# Patient Record
Sex: MALE | Race: WHITE | NOT HISPANIC OR LATINO | Employment: FULL TIME | URBAN - METROPOLITAN AREA
[De-identification: names, ages, dates, MRNs, and addresses within clinical notes are randomized per-mention and may not be internally consistent; named-entity substitution may affect disease eponyms.]

---

## 2017-07-14 ENCOUNTER — TRANSFERRED RECORDS (OUTPATIENT)
Dept: HEALTH INFORMATION MANAGEMENT | Facility: CLINIC | Age: 27
End: 2017-07-14

## 2019-04-22 ENCOUNTER — TRANSFERRED RECORDS (OUTPATIENT)
Dept: HEALTH INFORMATION MANAGEMENT | Facility: CLINIC | Age: 29
End: 2019-04-22

## 2019-04-22 LAB — HIV 1&2 EXT: NORMAL

## 2019-05-02 ENCOUNTER — TRANSFERRED RECORDS (OUTPATIENT)
Dept: HEALTH INFORMATION MANAGEMENT | Facility: CLINIC | Age: 29
End: 2019-05-02

## 2019-05-02 LAB
CHOLESTEROL (EXTERNAL): 147 MG/DL (ref 125–200)
HDLC SERPL-MCNC: 41 MG/DL (ref 25–120)
LDL CHOLESTEROL DIRECT (EXTERNAL): 83 MG/DL (ref 65–130)
TRIGLYCERIDES (EXTERNAL): 143 MG/DL (ref 75–150)

## 2019-05-31 ENCOUNTER — TRANSFERRED RECORDS (OUTPATIENT)
Dept: HEALTH INFORMATION MANAGEMENT | Facility: CLINIC | Age: 29
End: 2019-05-31

## 2020-07-29 ENCOUNTER — VIRTUAL VISIT (OUTPATIENT)
Dept: FAMILY MEDICINE | Facility: OTHER | Age: 30
End: 2020-07-29
Payer: COMMERCIAL

## 2020-07-29 DIAGNOSIS — Z20.822 SUSPECTED 2019 NOVEL CORONAVIRUS INFECTION: Primary | ICD-10-CM

## 2020-07-29 PROCEDURE — 99421 OL DIG E/M SVC 5-10 MIN: CPT | Performed by: PHYSICIAN ASSISTANT

## 2020-07-29 NOTE — PROGRESS NOTES
"Date: 2020 14:19:18  Clinician: Reid Adams  Clinician NPI: [id number]  Patient: Jesus Savage  Patient : 1990  Patient Address: [address] [facility]  [place] [id number]  Patient Phone: [phone]  Visit Protocol: URI  Patient Summary:  Jesus is a 30 year old ( : 1990 ) male who initiated a Visit for COVID-19 (Coronavirus) evaluation and screening. When asked the question \"Please sign me up to receive news, health information and promotions from [employer].\", Jesus responded \"No\".    Jesus states his symptoms started today.   His symptoms consist of myalgia, a sore throat, nasal congestion, rhinitis, malaise, and a headache.   Symptom details     Nasal secretions: The color of his mucus is clear.    Sore throat: Jesus reports having mild throat pain (1-3 on a 10 point pain scale), does not have exudate on his tonsils, and can swallow liquids. He is not sure if the lymph nodes in his neck are enlarged. A rash has not appeared on the skin since the sore throat started.     Headache: He states the headache is mild (1-3 on a 10 point pain scale).      Jesus denies having wheezing, nausea, teeth pain, ageusia, diarrhea, anosmia, facial pain or pressure, fever, cough, vomiting, ear pain, and chills. He also denies having recent facial or sinus surgery in the past 60 days and taking antibiotic medication in the past month. He is not experiencing dyspnea.   Precipitating events  Within the past week, Jesus has not been exposed to someone with strep throat. He has not recently been exposed to someone with influenza. Jesus has been in close contact with the following high risk individuals: people with asthma, heart disease or diabetes and children under the age of 5.   Pertinent COVID-19 (Coronavirus) information  In the past 14 days, Jesus has not worked in a congregate living setting.   He does not work or volunteer as healthcare worker or a  and does not work or volunteer in " a healthcare facility.   Jesus also has not lived in a congregate living setting in the past 14 days. He does not live with a healthcare worker.   Jesus has had a close contact with a laboratory-confirmed COVID-19 patient within 14 days of symptom onset. Additional information about contact with COVID-19 (Coronavirus) patient as reported by the patient (free text): Exposed on July 24 at a golf outing   Pertinent medical history  Jesus does not need a return to work/school note.   Weight: 205 lbs   Jesus does not smoke or use smokeless tobacco.   Weight: 205 lbs    MEDICATIONS: No current medications, ALLERGIES: NKDA  Clinician Response:  Dear Jesus,   Your symptoms show that you may have coronavirus (COVID-19). This illness can cause fever, cough and trouble breathing. Many people get a mild case and get better on their own. Some people can get very sick.  What should I do?  We would like to test you for this virus.   1. Please call [phone] to schedule your visit. Explain that you were referred by [facility] to have a COVID-19 test. Be ready to share your [facility] visit ID number.  The following will serve as your written order for this COVID Test, ordered by me, for the indication of suspected COVID [Z20.828]: The test will be ordered in [employer], our electronic health record, after you are scheduled. It will show as ordered and authorized by Max Camacho MD.  Order: COVID-19 (Coronavirus) PCR for SYMPTOMATIC testing from [facility].      2. When it's time for your COVID test:  Stay at least 6 feet away from others. (If someone will drive you to your test, stay in the backseat, as far away from the  as you can.)   Cover your mouth and nose with a mask, tissue or washcloth.  Go straight to the testing site. Don't make any stops on the way there or back.      3.Starting now: Stay home and away from others (self-isolate) until:   You've had no fever---and no medicine that reduces fever---for 3 full days (72 hours).  "And...   Your other symptoms have gotten better. For example, your cough or breathing has improved. And...   At least 10 days have passed since your symptoms started.       During this time, don't leave the house except for testing or medical care.   Stay in your own room, even for meals. Use your own bathroom if you can.   Stay away from others in your home. No hugging, kissing or shaking hands. No visitors.  Don't go to work, school or anywhere else.    Clean \"high touch\" surfaces often (doorknobs, counters, handles, etc.). Use a household cleaning spray or wipes. You'll find a full list of  on the EPA website: www.epa.gov/pesticide-registration/list-n-disinfectants-use-against-sars-cov-2.   Cover your mouth and nose with a mask, tissue or washcloth to avoid spreading germs.  Wash your hands and face often. Use soap and water.  Caregivers in these groups are at risk for severe illness due to COVID-19:  o People 65 years and older  o People who live in a nursing home or long-term care facility  o People with chronic disease (lung, heart, cancer, diabetes, kidney, liver, immunologic)  o People who have a weakened immune system, including those who:   Are in cancer treatment  Take medicine that weakens the immune system, such as corticosteroids  Had a bone marrow or organ transplant  Have an immune deficiency  Have poorly controlled HIV or AIDS  Are obese (body mass index of 40 or higher)  Smoke regularly   o Caregivers should wear gloves while washing dishes, handling laundry and cleaning bedrooms and bathrooms.  o Use caution when washing and drying laundry: Don't shake dirty laundry, and use the warmest water setting that you can.  o For more tips, go to www.cdc.gov/coronavirus/2019-ncov/downloads/10Things.pdf.    4.Sign up for Get[employer] Loop. We know it's scary to hear that you might have COVID-19. We want to track your symptoms to make sure you're okay over the next 2 weeks. Please look for an email from " Cece Estevez---this is a free, online program that we'll use to keep in touch. To sign up, follow the link in the email. Learn more at [email or web address]  How can I take care of myself?   Get lots of rest. Drink extra fluids (unless a doctor has told you not to).   Take Tylenol (acetaminophen) for fever or pain. If you have liver or kidney problems, ask your family doctor if it's okay to take Tylenol.   Adults can take either:    650 mg (two 325 mg pills) every 4 to 6 hours, or...   1,000 mg (two 500 mg pills) every 8 hours as needed.    Note: Don't take more than 3,000 mg in one day. Acetaminophen is found in many medicines (both prescribed and over-the-counter medicines). Read all labels to be sure you don't take too much.   For children, check the Tylenol bottle for the right dose. The dose is based on the child's age or weight.    If you have other health problems (like cancer, heart failure, an organ transplant or severe kidney disease): Call your specialty clinic if you don't feel better in the next 2 days.       Know when to call 911. Emergency warning signs include:    Trouble breathing or shortness of breath Pain or pressure in the chest that doesn't go away Feeling confused like you haven't felt before, or not being able to wake up Bluish-colored lips or face.  Where can I get more information?   [facility] -- About COVID-19: [email or web address]   CDC -- What to Do If You're Sick: www.cdc.gov/coronavirus/2019-ncov/about/steps-when-sick.html   CDC -- Ending Home Isolation: www.cdc.gov/coronavirus/2019-ncov/hcp/disposition-in-home-patients.html   CDC -- Caring for Someone: www.cdc.gov/coronavirus/2019-ncov/if-you-are-sick/care-for-someone.html   [facility] -- Interim Guidance for Hospital Discharge to Home: www.health.[facility].[place].us/diseases/coronavirus/hcp/hospdischarge.pdf   [facility] clinical trials (COVID-19 research studies):  clinicalaffairs.[facility]-clinical-trials    Below are the COVID-19 hotlines at the [place] Department of Health ([facility]). Interpreters are available.    For health questions: Call 217-763-7092 or 1-523.766.4794 (7 a.m. to 7 p.m.) For questions about schools and childcare: Call 796-506-3282 or 1-228.104.5079 (7 a.m. to 7 p.m.)    Diagnosis: Acute upper respiratory infection, unspecified  Diagnosis ICD: J06.9  Additional Clinician Notes: if your symptoms are not improving or worsen, Please go to one of our urgent care locations for evaluation.

## 2020-07-30 DIAGNOSIS — Z20.822 SUSPECTED 2019 NOVEL CORONAVIRUS INFECTION: ICD-10-CM

## 2020-07-30 PROCEDURE — U0003 INFECTIOUS AGENT DETECTION BY NUCLEIC ACID (DNA OR RNA); SEVERE ACUTE RESPIRATORY SYNDROME CORONAVIRUS 2 (SARS-COV-2) (CORONAVIRUS DISEASE [COVID-19]), AMPLIFIED PROBE TECHNIQUE, MAKING USE OF HIGH THROUGHPUT TECHNOLOGIES AS DESCRIBED BY CMS-2020-01-R: HCPCS | Performed by: FAMILY MEDICINE

## 2020-08-01 LAB
SARS-COV-2 RNA SPEC QL NAA+PROBE: NOT DETECTED
SPECIMEN SOURCE: NORMAL

## 2021-01-09 ENCOUNTER — HEALTH MAINTENANCE LETTER (OUTPATIENT)
Age: 31
End: 2021-01-09

## 2021-10-23 ENCOUNTER — HEALTH MAINTENANCE LETTER (OUTPATIENT)
Age: 31
End: 2021-10-23

## 2022-02-12 ENCOUNTER — HEALTH MAINTENANCE LETTER (OUTPATIENT)
Age: 32
End: 2022-02-12

## 2022-06-21 ENCOUNTER — OFFICE VISIT (OUTPATIENT)
Dept: FAMILY MEDICINE | Facility: CLINIC | Age: 32
End: 2022-06-21
Payer: COMMERCIAL

## 2022-06-21 VITALS
RESPIRATION RATE: 16 BRPM | HEART RATE: 83 BPM | SYSTOLIC BLOOD PRESSURE: 120 MMHG | WEIGHT: 214 LBS | DIASTOLIC BLOOD PRESSURE: 82 MMHG | TEMPERATURE: 97.7 F | OXYGEN SATURATION: 98 %

## 2022-06-21 DIAGNOSIS — F33.0 MILD RECURRENT MAJOR DEPRESSION (H): Primary | ICD-10-CM

## 2022-06-21 PROCEDURE — 96127 BRIEF EMOTIONAL/BEHAV ASSMT: CPT | Performed by: FAMILY MEDICINE

## 2022-06-21 PROCEDURE — 99203 OFFICE O/P NEW LOW 30 MIN: CPT | Performed by: FAMILY MEDICINE

## 2022-06-21 RX ORDER — FLUOXETINE 10 MG/1
10 CAPSULE ORAL DAILY
Qty: 60 CAPSULE | Refills: 1 | Status: SHIPPED | OUTPATIENT
Start: 2022-06-21 | End: 2022-08-04

## 2022-06-21 ASSESSMENT — ANXIETY QUESTIONNAIRES
7. FEELING AFRAID AS IF SOMETHING AWFUL MIGHT HAPPEN: NOT AT ALL
7. FEELING AFRAID AS IF SOMETHING AWFUL MIGHT HAPPEN: NOT AT ALL
GAD7 TOTAL SCORE: 11
GAD7 TOTAL SCORE: 11
2. NOT BEING ABLE TO STOP OR CONTROL WORRYING: SEVERAL DAYS
5. BEING SO RESTLESS THAT IT IS HARD TO SIT STILL: MORE THAN HALF THE DAYS
8. IF YOU CHECKED OFF ANY PROBLEMS, HOW DIFFICULT HAVE THESE MADE IT FOR YOU TO DO YOUR WORK, TAKE CARE OF THINGS AT HOME, OR GET ALONG WITH OTHER PEOPLE?: VERY DIFFICULT
6. BECOMING EASILY ANNOYED OR IRRITABLE: NEARLY EVERY DAY
3. WORRYING TOO MUCH ABOUT DIFFERENT THINGS: SEVERAL DAYS
1. FEELING NERVOUS, ANXIOUS, OR ON EDGE: MORE THAN HALF THE DAYS
GAD7 TOTAL SCORE: 11
4. TROUBLE RELAXING: MORE THAN HALF THE DAYS

## 2022-06-21 ASSESSMENT — PATIENT HEALTH QUESTIONNAIRE - PHQ9
SUM OF ALL RESPONSES TO PHQ QUESTIONS 1-9: 21
10. IF YOU CHECKED OFF ANY PROBLEMS, HOW DIFFICULT HAVE THESE PROBLEMS MADE IT FOR YOU TO DO YOUR WORK, TAKE CARE OF THINGS AT HOME, OR GET ALONG WITH OTHER PEOPLE: VERY DIFFICULT
SUM OF ALL RESPONSES TO PHQ QUESTIONS 1-9: 21
SUM OF ALL RESPONSES TO PHQ QUESTIONS 1-9: 21

## 2022-06-21 ASSESSMENT — PAIN SCALES - GENERAL: PAINLEVEL: NO PAIN (0)

## 2022-06-21 NOTE — PROGRESS NOTES
Assessment & Plan     Mild recurrent major depression (H)  Start fluoxetine 10 mg at at bedtime for 1 to 2 weeks then increase to 20 mg I will see him back in 6 to 8 weeks he does have my personal cell number is with family friends he will contact me if he has question concerns.  He will continue with his counseling.  He I did reassure him he will improve over time but it may take some time  - FLUoxetine (PROZAC) 10 MG capsule; Take 1 capsule (10 mg) by mouth daily  :[id number]}     Depression Screening Follow Up    PHQ 6/21/2022   PHQ-9 Total Score 21   Q9: Thoughts of better off dead/self-harm past 2 weeks Several days   F/U: Thoughts of suicide or self-harm No   F/U: Safety concerns No   No follow-ups on file.    Cain Rodriguez MD, MD  [facility] Home[facility]ENA Lee is a 32 year old, presenting for the following health issues:      History of Present Illness       Mental Health Follow-up:  Patient presents to follow-up on Depression & Anxiety.Patient's depression since last visit has been:  No change  The patient is not having other symptoms associated with depression.  Patient's anxiety since last visit has been:  No change  The patient is not having other symptoms associated with anxiety.  Any significant life events: No  Patient is not feeling anxious or having panic attacks.  Patient has no concerns about alcohol or drug use.    He eats 2-3 servings of fruits and vegetables daily.He consumes 0 sweetened beverage(s) daily.He exercises with enough effort to increase his heart rate 30 to 60 minutes per day.  He exercises with enough effort to increase his heart rate 4 days per week.   He is taking medications regularly.    Today's PHQ-9         PHQ-9 Total Score: 21    PHQ-9 Q9 Thoughts of better off dead/self-harm past 2 weeks :   Several days  Thoughts of suicide or self harm: (P) No  Self-harm Plan:     Self-harm Action:       Safety concerns for self or others: (P) No    How  difficult have these problems made it for you to do your work, take care of things at home, or get along with other people: Very difficult  Today's YASMIN-7 Score: 11       Patient is wife convinced him to come in and not deal with his depression he has been struggling with depression for the past couple of years.  He just made an appointment for counseling couple of weeks ago is gone to get into some intensive counseling but he is at the point where I think he needs medications.  I have known this family for a long time I do not dellen since he was young child.  We did have a extended conversation today about 20 to 30 minutes about depression and the pathophysiology of depression and how we treated both with counseling and medications.  I did talk to him about the medications we used side effects of the medications and the benefits.  Did answer his questions to satisfaction.  We also talked about life situation and other circumstances.  He is not suicidal at this time but never hurt himself.    Review of Systems   Constitutional, HEENT, cardiovascular, pulmonary, gi and gu systems are negative, except as otherwise noted.      Objective    There were no vitals taken for this visit.  There is no height or weight on file to calculate BMI.  Physical Exam   GENERAL: healthy, alert and no distress                    .  ..

## 2022-06-24 ASSESSMENT — PATIENT HEALTH QUESTIONNAIRE - PHQ9
10. IF YOU CHECKED OFF ANY PROBLEMS, HOW DIFFICULT HAVE THESE PROBLEMS MADE IT FOR YOU TO DO YOUR WORK, TAKE CARE OF THINGS AT HOME, OR GET ALONG WITH OTHER PEOPLE: VERY DIFFICULT
SUM OF ALL RESPONSES TO PHQ QUESTIONS 1-9: 21

## 2022-06-24 NOTE — COMMUNITY RESOURCES LIST (ENGLISH)
06/24/2022   [facility] - Outpatient Clinics  Stephani Briceno  For questions about this resource list or additional care needs, please contact your primary care clinic or care manager.  Phone: [phone]   Email: N/A   Address: [address] [place] [address]   Hours: N/A        Hotlines and Helplines       Hotline - Crisis help  1  [facility] - 24-Hour Mental Health Crisis Hotline Distance: 8.92 miles      COVID-19 Status: Phone/Virtual   [address] [place] [address]  Language: English  Hours: Mon - Sun Open 24 Hours   Phone: (849) 274-8560 Email: care@[facility] Website: http://[facility].org     2  [facility] Distance: 9.42 miles      COVID-19 Status: Phone/Virtual   [address] [place] [address]  Language: English  Hours: Mon - Sun Open 24 Hours   Phone: (508) 484-2654 Email: info.TradeBlockgriffin@[employer] Website: http://www.[facility].org/          Mental Health       Individual counseling  3  [facility] [place] Distance: 2.85 miles      COVID-19 Status: Regular Operations, COVID-19 Status: Phone/Virtual   [address] [place] [address]  Language: English, Armenian  Hours: Mon - Fri 7:00 AM - 5:00 PM  Fees: Insurance, Self Pay, Sliding Fee   Phone: (235) 926-8752 Email: info@[employer] Website: https://www.InsideSales.com/     4  [facility] [place] Distance: 8.24 miles      COVID-19 Status: Regular Operations, COVID-19 Status: Phone/Virtual   [address] [place] [address]  Language: English  Hours: Mon 8:00 AM - 8:00 PM , Tue - Wed 8:00 AM - 7:00 PM , Thu 8:00 AM - 8:00 PM , Fri 8:00 AM - 4:00 PM  Fees: Insurance, Self Pay, Sliding Fee   Phone: (631) 426-8809 Email: [facility]@[employer] Website: https://trilogycounsOn license of [facility].[place]/     Mental health support group  5  [facility] Distance: 17.3 miles      COVID-19 Status:  Unable to Verify   [address] [place] [address]  Language: English  Hours: Tue 6:30 PM - 8:30 PM  Fees: Free   Phone: (349) 398-3681 Email: rachel.nonprofit@[employer] Website: http://www.IActionable/pages/home          Important Numbers & Websites       Emergency Services   [address]  [place] [address]  Poison Control   (394) 122-2446  Suicide Prevention Lifeline   (850) 881-8832 (TALK)  Child Abuse Hotline   (287) 533-4229 (4-A-Child)  Sexual Assault Hotline   (436) 737-7858 (HOPE)  National Runaway Safeline   (638) 850-5453 (RUNAWAY)  All-Options Talkline   (846) 135-5798  Substance Abuse Referral   (370) 270-1949 (HELP)     ER physician

## 2022-06-24 NOTE — COMMUNITY RESOURCES LIST (ENGLISH)
06/24/2022   [facility] - Outpatient Clinics  Stephani Briceno  For questions about this resource list or additional care needs, please contact your primary care clinic or care manager.  Phone: [phone]   Email: N/A   Address: [address] [place] [address]   Hours: N/A        Hotlines and Helplines       Hotline - Crisis help  1  [facility] - 24-Hour Mental Health Crisis Hotline Distance: 8.92 miles      COVID-19 Status: Phone/Virtual   [address] [place] [address]  Language: English  Hours: Mon - Sun Open 24 Hours   Phone: (130) 389-6212 Email: care@[facility] Website: http://[facility].org     2  [facility] Distance: 9.42 miles      COVID-19 Status: Phone/Virtual   [address] [place] [address]  Language: English  Hours: Mon - Sun Open 24 Hours   Phone: (998) 931-2739 Email: info.Language Logisticsgriffin@[employer].CH Website: http://www.Language LogisticsSaint [facility].org/          Mental Health       Individual counseling  3  [facility] [place] Distance: 2.85 miles      COVID-19 Status: Regular Operations, COVID-19 Status: Phone/Virtual   [address] [place] [address]  Language: English, Scottish  Hours: Mon - Fri 7:00 AM - 5:00 PM  Fees: Insurance, Self Pay, Sliding Fee   Phone: (183) 720-8950 Email: info@[employer] Website: https://www.[employer]/     4  [facility] [place] Distance: 8.24 miles      COVID-19 Status: Regular Operations, COVID-19 Status: Phone/Virtual   [address] [place] [address]  Language: English  Hours: Mon 8:00 AM - 8:00 PM , Tue - Wed 8:00 AM - 7:00 PM , Thu 8:00 AM - 8:00 PM , Fri 8:00 AM - 4:00 PM  Fees: Insurance, Self Pay, Sliding Fee   Phone: (724) 133-4098 Email: trilogycounsel@[employer].CH Website: https://trilogycounsAdventHealth.[employer]oltServer/     Mental health support group  5  [facility] Distance: 17.3 miles      COVID-19 Status:  Unable to Verify   [address] [place] [address]  Language: English  Hours: Tue 6:30 PM - 8:30 PM  Fees: Free   Phone: (797) 695-6865 Email: rachel.nonprofit@[employer] Website: http://www.bazinga! Technologies/pages/home          Important Numbers & Websites       Emergency Services   [address]  [place] [address]  Poison Control   (411) 279-8077  Suicide Prevention Lifeline   (188) 985-2673 (TALK)  Child Abuse Hotline   (407) 698-2391 (4-A-Child)  Sexual Assault Hotline   (713) 405-8022 (HOPE)  National Runaway Safeline   (557) 305-4179 (RUNAWAY)  All-Options Talkline   (133) 203-8538  Substance Abuse Referral   (112) 996-3006 (HELP)

## 2022-06-24 NOTE — COMMUNITY RESOURCES LIST (ENGLISH)
06/24/2022   [facility] - Outpatient Clinics  Stephani Briceno  For questions about this resource list or additional care needs, please contact your primary care clinic or care manager.  Phone: [phone]   Email: N/A   Address: [address] [place] [address]   Hours: N/A        Hotlines and Helplines       Hotline - Crisis help  1  [facility] - 24-Hour Mental Health Crisis Hotline Distance: 8.92 miles      COVID-19 Status: Phone/Virtual   [address] [place] [address]  Language: English  Hours: Mon - Sun Open 24 Hours   Phone: (157) 998-4677 Email: care@[facility] Center[facility] Website: http://[facility].org     2  [facility] Distance: 9.42 miles      COVID-19 Status: Phone/Virtual   [address] [place] [address]  Language: English  Hours: Mon - Sun Open 24 Hours   Phone: (915) 390-6705 Email: info.64 [email or web address]@CrossCurrent Website: http://www.64 PixelsSt. Joseph Medical Center.org/          Mental Health       Individual counseling  3  [facility] [place] Distance: 2.85 miles      COVID-19 Status: Regular Operations, COVID-19 Status: Phone/Virtual   [address] [place] [address]  Language: English, Burmese  Hours: Mon - Fri 7:00 AM - 5:00 PM  Fees: Insurance, Self Pay, Sliding Fee   Phone: (454) 452-1744 Email: info@[employer] Website: https://www.LoginRadius/     4  [facility] [place] Distance: 8.24 miles      COVID-19 Status: Regular Operations, COVID-19 Status: Phone/Virtual   [address] [place] [address]  Language: English  Hours: Mon 8:00 AM - 8:00 PM , Tue - Wed 8:00 AM - 7:00 PM , Thu 8:00 AM - 8:00 PM , Fri 8:00 AM - 4:00 PM  Fees: Insurance, Self Pay, Sliding Fee   Phone: (546) 329-4371 Email: trilogycounsel@CrossCurrent Website: https://trilogycounsKind[facility].Uber/     Mental health support group  5  [facility] Distance: 17.3 miles      COVID-19 Status:  Unable to Verify   [address] [place] [address]  Language: English  Hours: Tue 6:30 PM - 8:30 PM  Fees: Free   Phone: (253) 633-5416 Email: rachel.nonprofit@[employer] Website: http://www.Priceza/pages/home          Important Numbers & Websites       Emergency Services   [address]  [place] [address]  Poison Control   (276) 308-3547  Suicide Prevention Lifeline   (310) 473-6733 (TALK)  Child Abuse Hotline   (413) 913-8587 (4-A-Child)  Sexual Assault Hotline   (427) 945-2530 (HOPE)  National Runaway Safeline   (897) 444-6821 (RUNAWAY)  All-Options Talkline   (578) 751-8660  Substance Abuse Referral   (254) 505-5132 (HELP)

## 2022-06-29 ENCOUNTER — OFFICE VISIT (OUTPATIENT)
Dept: FAMILY MEDICINE | Facility: CLINIC | Age: 32
End: 2022-06-29
Payer: COMMERCIAL

## 2022-06-29 VITALS
DIASTOLIC BLOOD PRESSURE: 74 MMHG | OXYGEN SATURATION: 99 % | SYSTOLIC BLOOD PRESSURE: 110 MMHG | HEIGHT: 70 IN | BODY MASS INDEX: 29.76 KG/M2 | RESPIRATION RATE: 14 BRPM | HEART RATE: 85 BPM | TEMPERATURE: 97 F | WEIGHT: 207.9 LBS

## 2022-06-29 DIAGNOSIS — Z11.4 SCREENING FOR HIV (HUMAN IMMUNODEFICIENCY VIRUS): ICD-10-CM

## 2022-06-29 DIAGNOSIS — Z11.59 NEED FOR HEPATITIS C SCREENING TEST: ICD-10-CM

## 2022-06-29 DIAGNOSIS — Z00.00 ROUTINE GENERAL MEDICAL EXAMINATION AT A HEALTH CARE FACILITY: Primary | ICD-10-CM

## 2022-06-29 DIAGNOSIS — F33.0 MILD RECURRENT MAJOR DEPRESSION (H): ICD-10-CM

## 2022-06-29 PROCEDURE — 99395 PREV VISIT EST AGE 18-39: CPT | Performed by: STUDENT IN AN ORGANIZED HEALTH CARE EDUCATION/TRAINING PROGRAM

## 2022-06-29 ASSESSMENT — ENCOUNTER SYMPTOMS
FEVER: 0
JOINT SWELLING: 0
ARTHRALGIAS: 0
CONSTIPATION: 0
PARESTHESIAS: 0
SORE THROAT: 0
HEADACHES: 0
ABDOMINAL PAIN: 0
DIZZINESS: 0
SHORTNESS OF BREATH: 0
COUGH: 0
NERVOUS/ANXIOUS: 0
HEARTBURN: 0
PALPITATIONS: 0
EYE PAIN: 0
HEMATURIA: 0
WEAKNESS: 0
DIARRHEA: 0
DYSURIA: 0
NAUSEA: 0
HEMATOCHEZIA: 0
CHILLS: 0
MYALGIAS: 0
FREQUENCY: 0

## 2022-06-29 ASSESSMENT — PAIN SCALES - GENERAL: PAINLEVEL: NO PAIN (0)

## 2022-06-29 NOTE — PROGRESS NOTES
SUBJECTIVE:   CC: Jesus Savage is an 32 year old male who presents for preventative health visit.       Patient has been advised of split billing requirements and indicates understanding: Yes  Healthy Habits:     Getting at least 3 servings of Calcium per day:  Yes    Bi-annual eye exam:  NO    Dental care twice a year:  Yes    Sleep apnea or symptoms of sleep apnea:  None    Diet:  Regular (no restrictions)    Frequency of exercise:  2-3 days/week    Duration of exercise:  30-45 minutes    Taking medications regularly:  Yes    Medication side effects:  None    PHQ-2 Total Score: 4    Additional concerns today:  No      Patient here for annual physical today no acute concerns.  Was recently seen for depression and started on fluoxetine.  Has not yet increased the dose to 20 mg daily but will do so in the next week and follow-up with PCP in 6 weeks.  Feel safe at home and has crisis line available.  Has seen counseling in the past but does not feel like he wants to do that currently.      Today's PHQ-2 Score:   PHQ-2 ( 1999 [employer]) 6/29/2022   Q1: Little interest or pleasure in doing things 2   Q2: Feeling down, depressed or hopeless 2   PHQ-2 Score 4   Q1: Little interest or pleasure in doing things More than half the days   Q2: Feeling down, depressed or hopeless More than half the days   PHQ-2 Score 4       Abuse: Current or Past(Physical, Sexual or Emotional)- No  Do you feel safe in your environment? Yes    Have you ever done Advance Care Planning? (For example, a Health Directive, POLST, or a discussion with a medical provider or your loved ones about your wishes): No, advance care planning information given to patient to review.  Patient declined advance care planning discussion at this time.    Social History     Tobacco Use     Smoking status: Never Smoker     Smokeless tobacco: Never Used   Substance Use Topics     Alcohol use: No         Alcohol Use 6/29/2022   Prescreen: >3 drinks/day or >7  "drinks/week? No       Last PSA: No results found for: PSA    Reviewed orders with patient. Reviewed health maintenance and updated orders accordingly - Yes      Reviewed and updated as needed this visit by clinical staff   Tobacco  Allergies  Meds   Med Hx  Surg Hx  Fam Hx  Soc Hx          Reviewed and updated as needed this visit by Provider                   History reviewed. No pertinent past medical history.   Past Surgical History:   Procedure Laterality Date     SOFT TISSUE SURGERY  2010    Right Meniscus     [facility] UGI ENDOSCOPY, SIMPLE EXAM  05/28/2009     [facility] UGI ENDOSCOPY, SIMPLE EXAM  05/12/2014       Review of Systems   Constitutional: Negative for chills and fever.   HENT: Negative for congestion, ear pain, hearing loss and sore throat.    Eyes: Negative for pain and visual disturbance.   Respiratory: Negative for cough and shortness of breath.    Cardiovascular: Negative for chest pain, palpitations and peripheral edema.   Gastrointestinal: Negative for abdominal pain, constipation, diarrhea, heartburn, hematochezia and nausea.   Genitourinary: Negative for dysuria, frequency, genital sores, hematuria, impotence, penile discharge and urgency.   Musculoskeletal: Negative for arthralgias, joint swelling and myalgias.   Skin: Negative for rash.   Neurological: Negative for dizziness, weakness, headaches and paresthesias.   Psychiatric/Behavioral: Negative for mood changes. The patient is not nervous/anxious.        OBJECTIVE:   /74 (BP Location: Right arm, Patient Position: Sitting, Cuff Size: Adult Regular)   Pulse 85   Temp 97  F (36.1  C) (Temporal)   Resp 14   Ht 1.79 m (5' 10.47\")   Wt 94.3 kg (207 lb 14.4 oz)   SpO2 99%   BMI 29.43 kg/m      Physical Exam  GENERAL: healthy, alert and no distress  EYES: Eyes grossly normal to inspection, PERRL and conjunctivae and sclerae normal  HENT: ear canals and TM's normal, nose and mouth without ulcers or lesions  NECK: no adenopathy, no " "asymmetry, masses, or scars and thyroid normal to palpation  RESP: lungs clear to auscultation - no rales, rhonchi or wheezes  CV: regular rate and rhythm, normal S1 S2, no S3 or S4, no murmur, click or rub, no peripheral edema and peripheral pulses strong  ABDOMEN: soft, nontender, no hepatosplenomegaly, no masses and bowel sounds normal  MS: no gross musculoskeletal defects noted, no edema  SKIN: no suspicious lesions or rashes  NEURO: Normal strength and tone, mentation intact and speech normal  PSYCH: mentation appears normal, affect normal/bright        ASSESSMENT/PLAN:   Jesus was seen today for physical.    Diagnoses and all orders for this visit:    Routine general medical examination at a health care facility  -     Lipid panel reflex to direct LDL Fasting; Future  -     Glucose; Future    Mild recurrent major depression (H)  Increase fluoxetine to 20 mg daily and follow-up with PCP in 6 weeks.  Sooner if new or worsening symptoms.  Consider counseling.    Screening for HIV (human immunodeficiency virus)  -     HIV Antigen Antibody Combo; Future    Need for hepatitis C screening test  -     Hepatitis C Screen Reflex to HCV RNA Quant and Genotype; Future    Other orders  -     REVIEW OF HEALTH MAINTENANCE PROTOCOL ORDERS        Patient has been advised of split billing requirements and indicates understanding: Yes    COUNSELING:   Reviewed preventive health counseling, as reflected in patient instructions       Regular exercise       Healthy diet/nutrition       Vision screening       Hearing screening       Consider Hep C screening for all patients one time for ages 18-79 years       HIV screeninx in teen years, 1x in adult years, and at intervals if high risk       Colorectal cancer screening       Prostate cancer screening    Estimated body mass index is 29.43 kg/m  as calculated from the following:    Height as of this encounter: 1.79 m (5' 10.47\").    Weight as of this encounter: 94.3 kg (207 lb " 14.4 oz).     Weight management plan: Discussed healthy diet and exercise guidelines    He reports that he has never smoked. He has never used smokeless tobacco.      Counseling Resources:  ATP IV Guidelines  Pooled Cohorts Equation Calculator  FRAX Risk Assessment  ICSI Preventive Guidelines  Dietary Guidelines for Americans, 2010  USDA's MyPlate  ASA Prophylaxis  Lung CA Screening    Jerome Reyes MD  [facility]  Answers for HPI/ROS submitted by the patient on 6/24/2022  If you checked off any problems, how difficult have these problems made it for you to do your work, take care of things at home, or get along with other people?: Very difficult  PHQ9 TOTAL SCORE: 21

## 2022-07-27 ENCOUNTER — MYC MEDICAL ADVICE (OUTPATIENT)
Dept: FAMILY MEDICINE | Facility: CLINIC | Age: 32
End: 2022-07-27

## 2022-07-27 DIAGNOSIS — F33.0 MILD RECURRENT MAJOR DEPRESSION (H): Primary | ICD-10-CM

## 2022-07-27 NOTE — TELEPHONE ENCOUNTER
From: Jesus Savage      Created: 7/27/2022 11:25 AM        *-*-*This message has not been handled.*-*-*    Hi,      I need to refill my fluoxetine. The pharmacy won t fill it right now since it says it s too early. I m almost out because I switched from 10mg the first 2 weeks to 20mg, which was the treatment plan, but the pharmacy said they needed updated dosing in order to refill it.

## 2022-08-04 ENCOUNTER — OFFICE VISIT (OUTPATIENT)
Dept: FAMILY MEDICINE | Facility: CLINIC | Age: 32
End: 2022-08-04
Payer: COMMERCIAL

## 2022-08-04 VITALS
TEMPERATURE: 97.2 F | HEART RATE: 82 BPM | SYSTOLIC BLOOD PRESSURE: 120 MMHG | BODY MASS INDEX: 30.03 KG/M2 | OXYGEN SATURATION: 99 % | DIASTOLIC BLOOD PRESSURE: 80 MMHG | WEIGHT: 212.13 LBS

## 2022-08-04 DIAGNOSIS — F33.0 MILD RECURRENT MAJOR DEPRESSION (H): Primary | ICD-10-CM

## 2022-08-04 PROCEDURE — 99213 OFFICE O/P EST LOW 20 MIN: CPT | Performed by: FAMILY MEDICINE

## 2022-08-04 RX ORDER — FLUOXETINE 10 MG/1
10 CAPSULE ORAL DAILY
Qty: 90 CAPSULE | Refills: 1 | Status: SHIPPED | OUTPATIENT
Start: 2022-08-04 | End: 2023-01-12

## 2022-08-04 ASSESSMENT — PATIENT HEALTH QUESTIONNAIRE - PHQ9
SUM OF ALL RESPONSES TO PHQ QUESTIONS 1-9: 8
10. IF YOU CHECKED OFF ANY PROBLEMS, HOW DIFFICULT HAVE THESE PROBLEMS MADE IT FOR YOU TO DO YOUR WORK, TAKE CARE OF THINGS AT HOME, OR GET ALONG WITH OTHER PEOPLE: SOMEWHAT DIFFICULT
SUM OF ALL RESPONSES TO PHQ QUESTIONS 1-9: 8

## 2022-08-04 ASSESSMENT — ANXIETY QUESTIONNAIRES
GAD7 TOTAL SCORE: 7
4. TROUBLE RELAXING: MORE THAN HALF THE DAYS
1. FEELING NERVOUS, ANXIOUS, OR ON EDGE: SEVERAL DAYS
2. NOT BEING ABLE TO STOP OR CONTROL WORRYING: SEVERAL DAYS
7. FEELING AFRAID AS IF SOMETHING AWFUL MIGHT HAPPEN: NOT AT ALL
IF YOU CHECKED OFF ANY PROBLEMS ON THIS QUESTIONNAIRE, HOW DIFFICULT HAVE THESE PROBLEMS MADE IT FOR YOU TO DO YOUR WORK, TAKE CARE OF THINGS AT HOME, OR GET ALONG WITH OTHER PEOPLE: SOMEWHAT DIFFICULT
5. BEING SO RESTLESS THAT IT IS HARD TO SIT STILL: SEVERAL DAYS
8. IF YOU CHECKED OFF ANY PROBLEMS, HOW DIFFICULT HAVE THESE MADE IT FOR YOU TO DO YOUR WORK, TAKE CARE OF THINGS AT HOME, OR GET ALONG WITH OTHER PEOPLE?: SOMEWHAT DIFFICULT
3. WORRYING TOO MUCH ABOUT DIFFERENT THINGS: SEVERAL DAYS
6. BECOMING EASILY ANNOYED OR IRRITABLE: SEVERAL DAYS
7. FEELING AFRAID AS IF SOMETHING AWFUL MIGHT HAPPEN: NOT AT ALL
GAD7 TOTAL SCORE: 7
GAD7 TOTAL SCORE: 7

## 2022-08-04 ASSESSMENT — PAIN SCALES - GENERAL: PAINLEVEL: NO PAIN (0)

## 2022-08-04 NOTE — PROGRESS NOTES
Assessment & Plan     Mild recurrent major depression (H)  Add 10 mg of Prozac to his 20 mg capsules daily for total of 30 mg daily we will recheck with him 1 month after he starts school sometime in October.  - FLUoxetine (PROZAC) 10 MG capsule  Dispense: 90 capsule; Refill: 1    Cain Rodriguez MD, MD  [facility] SHANNON Lee is a 32 year old, presenting for the following health issues:  Depression      History of Present Illness       Mental Health Follow-up:  Patient presents to follow-up on Depression & Anxiety.Patient's depression since last visit has been:  Better  The patient is not having other symptoms associated with depression.  Patient's anxiety since last visit has been:  Better  The patient is not having other symptoms associated with anxiety.  Any significant life events: other  Patient is not feeling anxious or having panic attacks.  Patient has no concerns about alcohol or drug use.    He eats 2-3 servings of fruits and vegetables daily.He consumes 1 sweetened beverage(s) daily.He exercises with enough effort to increase his heart rate 30 to 60 minutes per day.  He exercises with enough effort to increase his heart rate 3 or less days per week.   He is taking medications regularly.    Today's PHQ-9         PHQ-9 Total Score: 8    PHQ-9 Q9 Thoughts of better off dead/self-harm past 2 weeks :   Not at all    How difficult have these problems made it for you to do your work, take care of things at home, or get along with other people: Somewhat difficult  Today's YASMIN-7 Score: 7     I did have a long chat with the Juan Carlos today about his depression and anxiety.  He has been in counseling for about a month now things are moving in the right direction is on 20 mg of Prozac feels he is better probably 70% better but not back to 100%.  I did tell him we probably should increase his Prozac by 10 mg continue with the counseling and we will follow-up with him in a couple  months after school starts.  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  His relationship with his wife is excellent he has good family support.  This is working on getting back to sound mental health.  No concerns no red flags.        Review of Systems   Constitutional, HEENT, cardiovascular, pulmonary, gi and gu systems are negative, except as otherwise noted.      Objective    /80   Pulse 82   Temp 97.2  F (36.2  C) (Temporal)   Wt 96.2 kg (212 lb 2 oz)   SpO2 99%   BMI 30.03 kg/m    Body mass index is 30.03 kg/m .  Physical Exam   GENERAL: healthy, alert and no distress       Cain Rodriguez MD              .  ..

## 2022-10-09 ENCOUNTER — HEALTH MAINTENANCE LETTER (OUTPATIENT)
Age: 32
End: 2022-10-09

## 2023-01-11 ENCOUNTER — APPOINTMENT (OUTPATIENT)
Dept: CT IMAGING | Facility: CLINIC | Age: 33
End: 2023-01-11
Attending: FAMILY MEDICINE
Payer: COMMERCIAL

## 2023-01-11 ENCOUNTER — HOSPITAL ENCOUNTER (EMERGENCY)
Facility: CLINIC | Age: 33
Discharge: HOME | End: 2023-01-12
Attending: FAMILY MEDICINE | Admitting: FAMILY MEDICINE
Payer: COMMERCIAL

## 2023-01-11 DIAGNOSIS — G51.0 LEFT-SIDED BELL'S PALSY: ICD-10-CM

## 2023-01-11 DIAGNOSIS — G93.0 CYST OF BRAIN: ICD-10-CM

## 2023-01-11 LAB
ANION GAP SERPL CALCULATED.3IONS-SCNC: 6 MMOL/L (ref 3–14)
BASOPHILS # BLD AUTO: 0.1 10E3/UL (ref 0–0.2)
BASOPHILS NFR BLD AUTO: 1 %
BUN SERPL-MCNC: 14 MG/DL (ref 7–30)
CALCIUM SERPL-MCNC: 9.3 MG/DL (ref 8.5–10.1)
CHLORIDE BLD-SCNC: 105 MMOL/L (ref 94–109)
CO2 SERPL-SCNC: 29 MMOL/L (ref 20–32)
CREAT SERPL-MCNC: 1.07 MG/DL (ref 0.66–1.25)
CRP SERPL-MCNC: <2.9 MG/L (ref 0–8)
D DIMER PPP FEU-MCNC: <0.27 UG/ML FEU (ref 0–0.5)
EOSINOPHIL # BLD AUTO: 0.2 10E3/UL (ref 0–0.7)
EOSINOPHIL NFR BLD AUTO: 2 %
ERYTHROCYTE [DISTWIDTH] IN BLOOD BY AUTOMATED COUNT: 12.2 % (ref 10–15)
GFR SERPL CREATININE-BSD FRML MDRD: >90 ML/MIN/1.73M2
GLUCOSE BLD-MCNC: 138 MG/DL (ref 70–99)
HCT VFR BLD AUTO: 42.8 % (ref 40–53)
HGB BLD-MCNC: 15 G/DL (ref 13.3–17.7)
IMM GRANULOCYTES # BLD: 0 10E3/UL
IMM GRANULOCYTES NFR BLD: 0 %
INR PPP: 1.13 (ref 0.85–1.15)
LYMPHOCYTES # BLD AUTO: 3.2 10E3/UL (ref 0.8–5.3)
LYMPHOCYTES NFR BLD AUTO: 39 %
MCH RBC QN AUTO: 29.5 PG (ref 26.5–33)
MCHC RBC AUTO-ENTMCNC: 35 G/DL (ref 31.5–36.5)
MCV RBC AUTO: 84 FL (ref 78–100)
MONOCYTES # BLD AUTO: 0.6 10E3/UL (ref 0–1.3)
MONOCYTES NFR BLD AUTO: 7 %
NEUTROPHILS # BLD AUTO: 4.2 10E3/UL (ref 1.6–8.3)
NEUTROPHILS NFR BLD AUTO: 51 %
NRBC # BLD AUTO: 0 10E3/UL
NRBC BLD AUTO-RTO: 0 /100
PLATELET # BLD AUTO: 248 10E3/UL (ref 150–450)
POTASSIUM BLD-SCNC: 3.6 MMOL/L (ref 3.4–5.3)
RBC # BLD AUTO: 5.08 10E6/UL (ref 4.4–5.9)
SODIUM SERPL-SCNC: 140 MMOL/L (ref 133–144)
T4 FREE SERPL-MCNC: 0.88 NG/DL (ref 0.76–1.46)
TSH SERPL DL<=0.005 MIU/L-ACNC: 0.37 MU/L (ref 0.4–4)
WBC # BLD AUTO: 8.2 10E3/UL (ref 4–11)

## 2023-01-11 PROCEDURE — 84439 ASSAY OF FREE THYROXINE: CPT | Performed by: FAMILY MEDICINE

## 2023-01-11 PROCEDURE — 86618 LYME DISEASE ANTIBODY: CPT | Performed by: FAMILY MEDICINE

## 2023-01-11 PROCEDURE — 70498 CT ANGIOGRAPHY NECK: CPT

## 2023-01-11 PROCEDURE — 250N000012 HC RX MED GY IP 250 OP 636 PS 637: Performed by: FAMILY MEDICINE

## 2023-01-11 PROCEDURE — 70450 CT HEAD/BRAIN W/O DYE: CPT

## 2023-01-11 PROCEDURE — 250N000013 HC RX MED GY IP 250 OP 250 PS 637: Performed by: FAMILY MEDICINE

## 2023-01-11 PROCEDURE — 99285 EMERGENCY DEPT VISIT HI MDM: CPT | Mod: 25 | Performed by: FAMILY MEDICINE

## 2023-01-11 PROCEDURE — 36415 COLL VENOUS BLD VENIPUNCTURE: CPT | Performed by: FAMILY MEDICINE

## 2023-01-11 PROCEDURE — 250N000011 HC RX IP 250 OP 636: Performed by: FAMILY MEDICINE

## 2023-01-11 PROCEDURE — 85025 COMPLETE CBC W/AUTO DIFF WBC: CPT | Performed by: FAMILY MEDICINE

## 2023-01-11 PROCEDURE — 85610 PROTHROMBIN TIME: CPT | Performed by: FAMILY MEDICINE

## 2023-01-11 PROCEDURE — 84443 ASSAY THYROID STIM HORMONE: CPT | Performed by: FAMILY MEDICINE

## 2023-01-11 PROCEDURE — 86140 C-REACTIVE PROTEIN: CPT | Performed by: FAMILY MEDICINE

## 2023-01-11 PROCEDURE — 80048 BASIC METABOLIC PNL TOTAL CA: CPT | Performed by: FAMILY MEDICINE

## 2023-01-11 PROCEDURE — 93010 ELECTROCARDIOGRAM REPORT: CPT | Performed by: FAMILY MEDICINE

## 2023-01-11 PROCEDURE — 85379 FIBRIN DEGRADATION QUANT: CPT | Performed by: FAMILY MEDICINE

## 2023-01-11 PROCEDURE — 93005 ELECTROCARDIOGRAM TRACING: CPT | Performed by: FAMILY MEDICINE

## 2023-01-11 PROCEDURE — 250N000009 HC RX 250: Performed by: FAMILY MEDICINE

## 2023-01-11 RX ORDER — PREDNISONE 20 MG/1
60 TABLET ORAL ONCE
Status: COMPLETED | OUTPATIENT
Start: 2023-01-11 | End: 2023-01-11

## 2023-01-11 RX ORDER — IOPAMIDOL 755 MG/ML
500 INJECTION, SOLUTION INTRAVASCULAR ONCE
Status: COMPLETED | OUTPATIENT
Start: 2023-01-11 | End: 2023-01-11

## 2023-01-11 RX ORDER — VALACYCLOVIR HYDROCHLORIDE 500 MG/1
1000 TABLET, FILM COATED ORAL ONCE
Status: COMPLETED | OUTPATIENT
Start: 2023-01-11 | End: 2023-01-11

## 2023-01-11 RX ADMIN — VALACYCLOVIR HYDROCHLORIDE 1000 MG: 500 TABLET, FILM COATED ORAL at 22:09

## 2023-01-11 RX ADMIN — IOPAMIDOL 70 ML: 755 INJECTION, SOLUTION INTRAVENOUS at 21:00

## 2023-01-11 RX ADMIN — PREDNISONE 60 MG: 20 TABLET ORAL at 22:09

## 2023-01-11 RX ADMIN — SODIUM CHLORIDE 100 ML: 9 INJECTION, SOLUTION INTRAVENOUS at 21:00

## 2023-01-11 ASSESSMENT — ENCOUNTER SYMPTOMS
CHILLS: 0
FEVER: 0
WEAKNESS: 1
NUMBNESS: 1

## 2023-01-11 ASSESSMENT — ACTIVITIES OF DAILY LIVING (ADL)
ADLS_ACUITY_SCORE: 35
ADLS_ACUITY_SCORE: 35

## 2023-01-11 NOTE — Clinical Note
Jesus Savage was seen and treated in our emergency department on 1/11/2023.  He may return to work on 01/13/2023.       If you have any questions or concerns, please don't hesitate to call.      Dayanara Garcia, DO

## 2023-01-12 ENCOUNTER — TELEPHONE (OUTPATIENT)
Dept: NEUROSURGERY | Facility: CLINIC | Age: 33
End: 2023-01-12

## 2023-01-12 ENCOUNTER — APPOINTMENT (OUTPATIENT)
Dept: MRI IMAGING | Facility: CLINIC | Age: 33
End: 2023-01-12
Attending: FAMILY MEDICINE
Payer: COMMERCIAL

## 2023-01-12 VITALS
SYSTOLIC BLOOD PRESSURE: 126 MMHG | HEART RATE: 78 BPM | TEMPERATURE: 98.1 F | RESPIRATION RATE: 14 BRPM | OXYGEN SATURATION: 98 % | DIASTOLIC BLOOD PRESSURE: 78 MMHG

## 2023-01-12 LAB — B BURGDOR IGG+IGM SER QL: 0.19

## 2023-01-12 PROCEDURE — 70553 MRI BRAIN STEM W/O & W/DYE: CPT

## 2023-01-12 PROCEDURE — A9585 GADOBUTROL INJECTION: HCPCS | Performed by: EMERGENCY MEDICINE

## 2023-01-12 PROCEDURE — 255N000002 HC RX 255 OP 636: Performed by: EMERGENCY MEDICINE

## 2023-01-12 PROCEDURE — 250N000013 HC RX MED GY IP 250 OP 250 PS 637: Performed by: EMERGENCY MEDICINE

## 2023-01-12 RX ORDER — GADOBUTROL 604.72 MG/ML
10 INJECTION INTRAVENOUS ONCE
Status: COMPLETED | OUTPATIENT
Start: 2023-01-12 | End: 2023-01-12

## 2023-01-12 RX ORDER — VALACYCLOVIR HYDROCHLORIDE 1 G/1
1000 TABLET, FILM COATED ORAL 3 TIMES DAILY
Qty: 21 TABLET | Refills: 0 | Status: SHIPPED | OUTPATIENT
Start: 2023-01-12 | End: 2023-07-13

## 2023-01-12 RX ORDER — VALACYCLOVIR HYDROCHLORIDE 500 MG/1
1000 TABLET, FILM COATED ORAL 3 TIMES DAILY
Status: DISCONTINUED | OUTPATIENT
Start: 2023-01-12 | End: 2023-01-12 | Stop reason: HOSPADM

## 2023-01-12 RX ORDER — PREDNISONE 20 MG/1
TABLET ORAL
Qty: 18 TABLET | Refills: 0 | Status: SHIPPED | OUTPATIENT
Start: 2023-01-12 | End: 2023-07-13

## 2023-01-12 RX ADMIN — VALACYCLOVIR HYDROCHLORIDE 1000 MG: 500 TABLET, FILM COATED ORAL at 12:40

## 2023-01-12 RX ADMIN — GADOBUTROL 9.5 ML: 604.72 INJECTION INTRAVENOUS at 11:15

## 2023-01-12 ASSESSMENT — ACTIVITIES OF DAILY LIVING (ADL)
ADLS_ACUITY_SCORE: 35

## 2023-01-12 NOTE — DISCHARGE INSTRUCTIONS
Your imaging did not show any sign of an acute stroke.  Your symptoms of facial muscle weakness and altered sensation are due to something called Bell's palsy.  He was started on a steroid last night, and you may continue to take this starting this evening.  Take 60 mg of prednisone by mouth once daily and complete the course of 7 days    You should also take the Valtrex 3 times daily.  You were given 1 dose last night and 1 dose again today, so try to get at least 1 more dose in today if you can, or 2 if you are able.  You were given enough to complete the course of 1 week    The most important thing with Bell's palsy is to make sure that you do not get eye dryness.  You may need to use paper tape or other medical tape to gently keep your eye lid closed overnight.  You should also use rewetting drops as frequently as needed to keep the eyeball moist and avoid dryness or corneal abrasion    A referral to follow-up with neurosurgery regarding the arachnoid cyst has been placed.  Somebody should be contacting you to make this appointment.  If you do not hear from them contact the scheduling line to make this appointment.    You develop any new or worsening symptoms, do not hesitate to return to the emergency room for evaluation

## 2023-01-12 NOTE — TELEPHONE ENCOUNTER
[facility]    Phone Message    May a detailed message be left on voicemail: yes     Reason for Call: Appointment Intake    Referring Provider Name: Dayanara Garcia DO  Diagnosis and/or Symptoms: Cyst of brain [G93.0]    Writer received call from [facility] and was advised that patient was incorrectly scheduled with Dr. Maciel, who does not see patients with this diagnosis. Dr. Maciel is requesting patient be seen at [facility].  Writer unable to schedule without instructions, please review.    Action Taken: Message routed to:  [facility] ([facility] Neurosurgery    Travel Screening: Not Applicable

## 2023-01-12 NOTE — CONSULTS
[facility]    Stroke Telephone Note    I was called by Jacob Holloway on 01/11/23 regarding patient Jesus Savage. The patient is a 32 year old male who presents with left facial droop that started last night bu has progressed today. He has left lip droop and loss of taste and left lateral tongue paresthesias with left eye tearing.     Imaging Findings                                                                    IMPRESSION:  1.  Presumed arachnoid cyst in the right posterior fossa as described above, with chronic appearing mass effect upon the cerebellar hemispheres and vermis, given the absence of parenchymal hypoattenuation to suggest vasogenic edema and only mild   effacement of the fourth ventricle on the right.  2.  No acute intracranial hemorrhage or CT evidence for acute infarction.                                                                   IMPRESSION:   HEAD CTA:   1.  No large vessel occlusion or hemodynamically significant stenosis.     NECK CTA:  1.  No large vessel occlusion or hemodynamically significant stenosis    Impression    # Left facial droop - started yesterday and has progressed, no other deficits or findings. Most likely etiology is Bell's plasy, has evidence of upper and lower face involvement. But given his family history of stroke in the young and antiphospholipid antibody syndrome in his father would recommend MRI when available.   RF: Famhx of stroke in the young    Recommendations     MRI brain WWO with coronal DWI in am  Further recs pending on imaging         My recommendations are based on the information provided over the phone by Jesus Savage's in-person providers. They are not intended to replace the clinical judgment of his in-person providers. I was not requested to personally see or examine the patient at this time.    The Stroke Staff is Dr. Jean Baptiste.    Sandy Wright MD  Vascular Neurology Fellow    To page me  "or covering stroke neurology team member, click here: [facility]  Choose \"On Call\" tab at top, then select \"NEUROLOGY/ALL SITES\" from middle drop-down box, press Enter, then look for \"stroke\" or \"telestroke\" for your site.            "

## 2023-01-12 NOTE — ED PROVIDER NOTES
History     Chief Complaint   Patient presents with     Stroke Symptoms     HPI  Jesus Savage is a 32 year old male who presents to the emergency room today secondary to concerns of some facial muscle weakness that for started about 24 hours ago but seems to have gradually gotten more severe today.  He is noted a little tearing from his left eye and feels like he can close his left eyelid as tight as typical.  He is noted somewhat odd sensation to the left cheek and feels like his mouth/lips are not quite acting normal.  His wife has noted a little droop sensation to his left cheek and face.  He is concerned because his father had a stroke at age 45.  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  When asked if he has had any recent cold or flu symptoms he states that he just got over a cold with nasal congestion and mild cough.  He denied any oral lesions or skin rash to the face.  Patient takes a antidepressant medicine but denies any other oral medications.  He denies smoking but admits to chewing tobacco at times.  Denies any recent fall or injury has reason for his facial weakness.  Denies any fever.  No prior similar history of weakness to the left side of his face.  He denies headache, skin rash, or hearing loss.    Allergies:  Allergies   Allergen Reactions     No Known Drug Allergies        Problem List:    Patient Active Problem List    Diagnosis Date Noted     Urticaria 09/21/2006     Priority: Medium     Problem list name updated by automated process. Provider to review          Past Medical History:    No past medical history on file.    Past Surgical History:    Past Surgical History:   Procedure Laterality Date     SOFT TISSUE SURGERY  2010    Right Meniscus     [facility] UGI ENDOSCOPY, SIMPLE EXAM  05/28/2009     [facility] UGI ENDOSCOPY, SIMPLE EXAM  05/12/2014       Family  History:    Family History   Problem Relation Age of Onset     Hypertension Mother      Lupus Father      C.A.D. No family hx of      Diabetes No family hx of      Cancer - colorectal No family hx of        Social History:  Marital Status:   [2]  Social History     Tobacco Use     Smoking status: Never     Smokeless tobacco: Never   Vaping Use     Vaping Use: Never used   Substance Use Topics     Alcohol use: No     Drug use: No        Medications:    FLUoxetine (PROZAC) 10 MG capsule  FLUoxetine (PROZAC) 20 MG capsule          Review of Systems   Constitutional: Negative for chills and fever.   HENT: Positive for congestion (Admits to a recent cold-like illness that he just got over.).    Neurological: Positive for weakness (left facial) and numbness (Left side of tongue).   All other systems reviewed and are negative.      Physical Exam   BP: (!) 139/91  Pulse: 89  Temp: 98.1  F (36.7  C)  Resp: 18  SpO2: 96 %      Physical Exam  Vitals and nursing note reviewed. Exam conducted with a chaperone present (Spouse).   Constitutional:       Appearance: He is not ill-appearing or toxic-appearing.   HENT:      Head: Normocephalic and atraumatic.      Left Ear: Tympanic membrane, ear canal and external ear normal.      Mouth/Throat:      Mouth: Mucous membranes are moist.      Pharynx: No oropharyngeal exudate or posterior oropharyngeal erythema.   Eyes:      General: No scleral icterus.     Conjunctiva/sclera: Conjunctivae normal.      Pupils: Pupils are equal, round, and reactive to light.      Comments: Patient noted to have some clear watering from his left eye and is able to close his eyelids but it takes him much more effort than closing his right side.   Cardiovascular:      Rate and Rhythm: Normal rate.      Pulses: Normal pulses.   Pulmonary:      Effort: Pulmonary effort is normal. No respiratory distress.   Musculoskeletal:         General: Normal range of motion.      Cervical back: Normal range of  motion and neck supple.   Skin:     Capillary Refill: Capillary refill takes less than 2 seconds.      Findings: No rash.   Neurological:      Mental Status: He is alert and oriented to person, place, and time.      Motor: Weakness (Mild left-sided facial weakness/droop) present.      Coordination: Coordination normal.      Gait: Gait normal.      Deep Tendon Reflexes: Reflexes normal.   Psychiatric:         Mood and Affect: Mood normal.         Behavior: Behavior normal.         Thought Content: Thought content normal.         Judgment: Judgment normal.         ED Course                 Procedures              EKG Interpretation:      Interpreted by Jacob Holloway DO  Time reviewed: 9:17 PM  Symptoms at time of EKG: left sided facial weakness   Rhythm: normal sinus   Rate: normal  Axis: normal  Ectopy: none  Conduction: normal  ST Segments/ T Waves: No ST-T wave changes  Q Waves: none  Comparison to prior: No old EKG available    Clinical Impression: normal EKG          Critical Care time:  none               Results for orders placed or performed during the hospital encounter of 01/11/23 (from the past 24 hour(s))   CBC with platelets differential    Narrative    The following orders were created for panel order CBC with platelets differential.  Procedure                               Abnormality         Status                     ---------                               -----------         ------                     CBC with platelets and d...[[id number]]                      Final result                 Please view results for these tests on the individual orders.   CRP inflammation   Result Value Ref Range    CRP Inflammation <2.9 0.0 - 8.0 mg/L   Basic metabolic panel   Result Value Ref Range    Sodium 140 133 - 144 mmol/L    Potassium 3.6 3.4 - 5.3 mmol/L    Chloride 105 94 - 109 mmol/L    Carbon Dioxide (CO2) 29 20 - 32 mmol/L    Anion Gap 6 3 - 14 mmol/L    Urea Nitrogen 14 7 - 30 mg/dL    Creatinine  1.07 0.66 - 1.25 mg/dL    Calcium 9.3 8.5 - 10.1 mg/dL    Glucose 138 (H) 70 - 99 mg/dL    GFR Estimate >90 >60 mL/min/1.73m2   TSH with free T4 reflex   Result Value Ref Range    TSH 0.37 (L) 0.40 - 4.00 mU/L   D dimer quantitative   Result Value Ref Range    D-Dimer Quantitative <0.27 0.00 - 0.50 ug/mL FEU    Narrative    This D-dimer assay is intended for use in conjunction with a clinical pretest probability assessment model to exclude pulmonary embolism (PE) and deep venous thrombosis (DVT) in outpatients suspected of PE or DVT. The cut-off value is 0.50 ug/mL FEU.   INR   Result Value Ref Range    INR 1.13 0.85 - 1.15   CBC with platelets and differential   Result Value Ref Range    WBC Count 8.2 4.0 - 11.0 10e3/uL    RBC Count 5.08 4.40 - 5.90 10e6/uL    Hemoglobin 15.0 13.3 - 17.7 g/dL    Hematocrit 42.8 40.0 - 53.0 %    MCV 84 78 - 100 fL    MCH 29.5 26.5 - 33.0 pg    MCHC 35.0 31.5 - 36.5 g/dL    RDW 12.2 10.0 - 15.0 %    Platelet Count 248 150 - 450 10e3/uL    % Neutrophils 51 %    % Lymphocytes 39 %    % Monocytes 7 %    % Eosinophils 2 %    % Basophils 1 %    % Immature Granulocytes 0 %    NRBCs per 100 WBC 0 <1 /100    Absolute Neutrophils 4.2 1.6 - 8.3 10e3/uL    Absolute Lymphocytes 3.2 0.8 - 5.3 10e3/uL    Absolute Monocytes 0.6 0.0 - 1.3 10e3/uL    Absolute Eosinophils 0.2 0.0 - 0.7 10e3/uL    Absolute Basophils 0.1 0.0 - 0.2 10e3/uL    Absolute Immature Granulocytes 0.0 <=0.4 10e3/uL    Absolute NRBCs 0.0 10e3/uL   T4 free   Result Value Ref Range    Free T4 0.88 0.76 - 1.46 ng/dL   CT Head w/o Contrast    Narrative    EXAM: CT HEAD W/O CONTRAST  LOCATION: [facility]  DATE/TIME: 1/11/2023 9:01 PM    INDICATION: Left-sided facial weakness  COMPARISON: None.  TECHNIQUE: Routine CT Head without IV contrast. Multiplanar reformats. Dose reduction techniques were used.    FINDINGS:  INTRACRANIAL CONTENTS: No acute intracranial hemorrhages CT evidence for acute infarction.  There is a CSF attenuation extra-axial collection overlying the posterior right cerebellar hemisphere, greatest superiorly. This measures approximately 4.0 cm AP x   9.5 cm TV x 5.8 cm CC and produces mass effect upon the underlying cerebellar hemispheres, right greater than left, and cerebellar vermis. Mass effect also mildly effaces the fourth ventricle on the right. No underlying parenchymal attenuation   abnormality to suggest vasogenic edema. Normal parenchymal attenuation. Mild prominence of the lateral and third ventricles for patient age.     VISUALIZED ORBITS/SINUSES/MASTOIDS: No intraorbital abnormality. No paranasal sinus mucosal disease. No middle ear or mastoid effusion.    BONES/SOFT TISSUES: No acute abnormality.        Impression    IMPRESSION:  1.  Presumed arachnoid cyst in the right posterior fossa as described above, with chronic appearing mass effect upon the cerebellar hemispheres and vermis, given the absence of parenchymal hypoattenuation to suggest vasogenic edema and only mild   effacement of the fourth ventricle on the right.  2.  No acute intracranial hemorrhage or CT evidence for acute infarction.   CTA Head Neck with Contrast    Narrative    EXAM: CTA HEAD NECK W CONTRAST  LOCATION: [facility]  DATE/TIME: 1/11/2023 9:15 PM    INDICATION: Left sided facial weakness paresthesia  COMPARISON: None.  CONTRAST: 70mL, Isovue 370  TECHNIQUE: Head and neck CT angiogram with IV contrast. Axial helical CT images of the head and neck vessels obtained during the arterial phase of intravenous contrast administration. Axial 2D reconstructed images and multiplanar 3D MIP reconstructed   images of the head and neck vessels were performed by the technologist. Dose reduction techniques were used. All stenosis measurements made according to NASCET criteria unless otherwise specified.    FINDINGS:   HEAD CTA:  ANTERIOR CIRCULATION: No stenosis/occlusion, aneurysm, or high  flow vascular malformation. Standard [place] anatomy.    POSTERIOR CIRCULATION: No stenosis/occlusion, aneurysm, or high flow vascular malformation. Dominant left and smaller right vertebral artery contribute to a normal basilar artery.     DURAL VENOUS SINUSES: Expected enhancement of the major dural venous sinuses.    NECK CTA:  RIGHT CAROTID: No measurable stenosis or dissection.    LEFT CAROTID: No measurable stenosis or dissection.    VERTEBRAL ARTERIES: No focal stenosis or dissection. Dominant left and smaller right vertebral arteries.    AORTIC ARCH: Vascular variant aortic arch origin left vertebral artery.  No significant stenosis at the origin of the great vessels.    NONVASCULAR STRUCTURES: Unremarkable.      Impression    IMPRESSION:   HEAD CTA:   1.  No large vessel occlusion or hemodynamically significant stenosis.    NECK CTA:  1.  No large vessel occlusion or hemodynamically significant stenosis.       Medications   Saline 100mL Bag (100 mLs Intravenous Given 1/11/23 2100)   iopamidol (ISOVUE-370) solution 500 mL (70 mLs Intravenous Given 1/11/23 2100)   predniSONE (DELTASONE) tablet 60 mg (60 mg Oral Given 1/11/23 2209)   valACYclovir (VALTREX) tablet 1,000 mg (1,000 mg Oral Given 1/11/23 2209)       Assessments & Plan (with Medical Decision Making)      32-year-old male to the ER secondary concerns of left sided facial paresthesia and some muscle weakness involving the eyebrow and eyelids as well as the left cheek and left lateral lip.  Symptoms started about 24 to 36 hours ago.  He has noticed that they have gotten a little worse today so he came in to get checked.  He has had no other symptoms.  He denied fever, headache, nausea, vomiting, or any other weakness involving his extremities.  He has had no recent fall or injury.  He does have a family history that significant for stroke which made the family concerned about his symptoms.  His father had a stroke in his 40s.  Patient's exam  and history were consistent with a Bell's palsy.  The patient had a viral type cold/congestion event this last week.  Because of the significant past medical history of stroke a CT and CTA were ordered with the findings as noted above.  No acute stroke identified but he had a large cystic type structure in the posterior right fossa of the brain.  Because of the abnormal CT findings I contacted neurosurgery and spoke to Leeann Rojsa.  Leeann discussed the situation with me and talk to Dr. Patino, neurosurgeon, who recommended the patient be transferred to [facility] for MRI evaluation.  We do not have MRI available at nights here at [facility].  [facility] was full and there was no available beds to transfer the patient to.  Leeann suggested that we contact the stroke neuro group at the [facility] which I did.  I spoke to Dr. Wright, neurologist, who agreed that it sound like the patient likely has a Bell's palsy but with the abnormal CT scan she recommended that the patient be monitored in her hospital through the night and to have a MRI of the brain with and without contrast along with diffusion-weighted imaging in the a.m. when MRI is available here at our hospital.  She asked to contact them with the MRI results and further recommendations will be made.  To contact her sooner should he have increase or worsening symptoms.  Patient was signed out to Dr. Dayanara Garcia at shift change.     I have reviewed the nursing notes.    I have reviewed the findings, diagnosis, plan and need for follow up with the patient.       Medical Decision Making  The patient presented with a problem that is an acute illness with systemic symptoms and an acute health issue posing potential threat to life or bodily function.    The patient's evaluation involved:  ordering and review of 3+ test(s) (see separate area of note for details)  discussion of management or test interpretation  with another health professional (see separate area of note for details)    The patient's management involved a decision regarding hospitalization.        Final diagnoses:   Left-sided Bell's palsy   Cyst of brain       1/11/2023   [facility] EMERGENCY DEPT     Jacob Holloway,   01/12/23 0315

## 2023-01-12 NOTE — PROGRESS NOTES
Contacted regarding 33 yo male presenting to ER with left facial drop over last 30 hours.    Head CT reveals right arachnoid cyst.    Head CT:  IMPRESSION:  1.  Presumed arachnoid cyst in the right posterior fossa as described above, with chronic appearing mass effect upon the cerebellar hemispheres and vermis, given the absence of parenchymal hypoattenuation to suggest vasogenic edema and only mild   effacement of the fourth ventricle on the right.  2.  No acute intracranial hemorrhage or CT evidence for acute infarction.    RECOMMENDATIONS:  Arachnoid cyst incidental finding and not cause of left facial droop.  Discussed with Dr. Patino who recommends brain MRI to rule out stroke.  Unable to obtain MRI this evening at [facility].  Contacted transfer line, no beds at [facility].  Discussed recommendations with ER provider.      Fiona Fabian MPAS  [facility] Neurosurgery  [address] [address]    Tel [phone]  Pager [phone]

## 2023-01-12 NOTE — ED PROVIDER NOTES
Signout at change of shift from Dr. César Holloway, see his note for patient presenting details and initial work-up.  Briefly, this is a 32-year-old male who presented to the emergency room last night for left-sided facial muscle weakness that started over 24 hours prior to arrival but was progressively getting worse.  Noticed some left facial drooping, paresthesias to the left lateral tongue.  Recent viral URI symptoms.  Had some tearing from his left eye and had difficulty completely closing the left eyelid.  Symptoms were consistent with Bell's palsy, but previous provider proceeded with head CT and CTA due to the patient's reported family history of his father having a stroke at age 45.  CTA was clear, but noncontrast CT did reveal a large arachnoid cyst in the right posterior fossa, appears chronic.  Previous provider initially consulted with neurosurgery, who ultimately determined the patient needed transfer to [facility] for MRI and evaluation.  No beds were available, and they recommended contacting stroke neurology at [facility].  The on-call stroke neurologist reviewed the imaging and recommended patient be treated for Bell's palsy, and initiated steroid and antiviral therapy.  However, stroke neurology did states that the patient should likely have an MRI before being discharged from the emergency room, but was okay to board overnight and did not need immediate transfer for emergent MRI.  Patient has been boarding overnight imaging pending.  This has been ordered for this morning.  Advised to follow-up on imaging results and contact stroke neurology team at [facility] once these results are available.    Results for orders placed or performed during the hospital encounter of 01/11/23   CT Head w/o Contrast     Status: None    Narrative    EXAM: CT HEAD W/O CONTRAST  LOCATION: [facility]  DATE/TIME: 1/11/2023 9:01 PM    INDICATION: Left-sided facial weakness  COMPARISON:  None.  TECHNIQUE: Routine CT Head without IV contrast. Multiplanar reformats. Dose reduction techniques were used.    FINDINGS:  INTRACRANIAL CONTENTS: No acute intracranial hemorrhages CT evidence for acute infarction. There is a CSF attenuation extra-axial collection overlying the posterior right cerebellar hemisphere, greatest superiorly. This measures approximately 4.0 cm AP x   9.5 cm TV x 5.8 cm CC and produces mass effect upon the underlying cerebellar hemispheres, right greater than left, and cerebellar vermis. Mass effect also mildly effaces the fourth ventricle on the right. No underlying parenchymal attenuation   abnormality to suggest vasogenic edema. Normal parenchymal attenuation. Mild prominence of the lateral and third ventricles for patient age.     VISUALIZED ORBITS/SINUSES/MASTOIDS: No intraorbital abnormality. No paranasal sinus mucosal disease. No middle ear or mastoid effusion.    BONES/SOFT TISSUES: No acute abnormality.        Impression    IMPRESSION:  1.  Presumed arachnoid cyst in the right posterior fossa as described above, with chronic appearing mass effect upon the cerebellar hemispheres and vermis, given the absence of parenchymal hypoattenuation to suggest vasogenic edema and only mild   effacement of the fourth ventricle on the right.  2.  No acute intracranial hemorrhage or CT evidence for acute infarction.   CTA Head Neck with Contrast     Status: None    Narrative    EXAM: CTA HEAD NECK W CONTRAST  LOCATION: [facility]  DATE/TIME: 1/11/2023 9:15 PM    INDICATION: Left sided facial weakness paresthesia  COMPARISON: None.  CONTRAST: 70mL, Isovue 370  TECHNIQUE: Head and neck CT angiogram with IV contrast. Axial helical CT images of the head and neck vessels obtained during the arterial phase of intravenous contrast administration. Axial 2D reconstructed images and multiplanar 3D MIP reconstructed   images of the head and neck vessels were performed  by the technologist. Dose reduction techniques were used. All stenosis measurements made according to NASCET criteria unless otherwise specified.    FINDINGS:   HEAD CTA:  ANTERIOR CIRCULATION: No stenosis/occlusion, aneurysm, or high flow vascular malformation. Standard [place] anatomy.    POSTERIOR CIRCULATION: No stenosis/occlusion, aneurysm, or high flow vascular malformation. Dominant left and smaller right vertebral artery contribute to a normal basilar artery.     DURAL VENOUS SINUSES: Expected enhancement of the major dural venous sinuses.    NECK CTA:  RIGHT CAROTID: No measurable stenosis or dissection.    LEFT CAROTID: No measurable stenosis or dissection.    VERTEBRAL ARTERIES: No focal stenosis or dissection. Dominant left and smaller right vertebral arteries.    AORTIC ARCH: Vascular variant aortic arch origin left vertebral artery.  No significant stenosis at the origin of the great vessels.    NONVASCULAR STRUCTURES: Unremarkable.      Impression    IMPRESSION:   HEAD CTA:   1.  No large vessel occlusion or hemodynamically significant stenosis.    NECK CTA:  1.  No large vessel occlusion or hemodynamically significant stenosis.   MR Brain w/o & w Contrast     Status: None    Narrative    MRI BRAIN WITHOUT AND WITH CONTRAST  1/12/2023 11:34 AM    HISTORY: Abnormal CT of head, left facial weakness.     TECHNIQUE: Multiplanar, multisequence MRI of the brain without and  with 9.5 mL Gadavist.    COMPARISON: Head CT 1/11/2023.    FINDINGS:  No evidence of acute ischemia or hemorrhage. Large well-circumscribed  T2 hyperintense nonenhancing nonrestricting lesion within the right  posterior fossa measuring approximately 10.1 x 4.7 x 5.8 cm. The  lesion contributes to mass effect on the cerebellum , however the  fourth ventricle and foramen magnum are patent. Parenchyma is  otherwise within normal limits. Small area of presumed benign venous  enhancement within the right superior frontal  gyrus.    Marrow signal is within normal limits. Mild paranasal sinus mucosal  thickening. The visualized tympanic and mastoid cavities are  unremarkable.      Impression    IMPRESSION:  1. No acute abnormality.  2. Large posterior fossa arachnoid cyst.    CHELO CARLSON MD         SYSTEM ID:  [id number]   CRP inflammation     Status: Normal   Result Value Ref Range    CRP Inflammation <2.9 0.0 - 8.0 mg/L   Basic metabolic panel     Status: Abnormal   Result Value Ref Range    Sodium 140 133 - 144 mmol/L    Potassium 3.6 3.4 - 5.3 mmol/L    Chloride 105 94 - 109 mmol/L    Carbon Dioxide (CO2) 29 20 - 32 mmol/L    Anion Gap 6 3 - 14 mmol/L    Urea Nitrogen 14 7 - 30 mg/dL    Creatinine 1.07 0.66 - 1.25 mg/dL    Calcium 9.3 8.5 - 10.1 mg/dL    Glucose 138 (H) 70 - 99 mg/dL    GFR Estimate >90 >60 mL/min/1.73m2   TSH with free T4 reflex     Status: Abnormal   Result Value Ref Range    TSH 0.37 (L) 0.40 - 4.00 mU/L   D dimer quantitative     Status: Normal   Result Value Ref Range    D-Dimer Quantitative <0.27 0.00 - 0.50 ug/mL FEU    Narrative    This D-dimer assay is intended for use in conjunction with a clinical pretest probability assessment model to exclude pulmonary embolism (PE) and deep venous thrombosis (DVT) in outpatients suspected of PE or DVT. The cut-off value is 0.50 ug/mL FEU.   INR     Status: Normal   Result Value Ref Range    INR 1.13 0.85 - 1.15   CBC with platelets and differential     Status: None   Result Value Ref Range    WBC Count 8.2 4.0 - 11.0 10e3/uL    RBC Count 5.08 4.40 - 5.90 10e6/uL    Hemoglobin 15.0 13.3 - 17.7 g/dL    Hematocrit 42.8 40.0 - 53.0 %    MCV 84 78 - 100 fL    MCH 29.5 26.5 - 33.0 pg    MCHC 35.0 31.5 - 36.5 g/dL    RDW 12.2 10.0 - 15.0 %    Platelet Count 248 150 - 450 10e3/uL    % Neutrophils 51 %    % Lymphocytes 39 %    % Monocytes 7 %    % Eosinophils 2 %    % Basophils 1 %    % Immature Granulocytes 0 %    NRBCs per 100 WBC 0 <1 /100    Absolute Neutrophils 4.2 1.6  - 8.3 10e3/uL    Absolute Lymphocytes 3.2 0.8 - 5.3 10e3/uL    Absolute Monocytes 0.6 0.0 - 1.3 10e3/uL    Absolute Eosinophils 0.2 0.0 - 0.7 10e3/uL    Absolute Basophils 0.1 0.0 - 0.2 10e3/uL    Absolute Immature Granulocytes 0.0 <=0.4 10e3/uL    Absolute NRBCs 0.0 10e3/uL   T4 free     Status: Normal   Result Value Ref Range    Free T4 0.88 0.76 - 1.46 ng/dL   Lyme Disease Total Abs Bld with Reflex to Confirm CLIA     Status: Normal   Result Value Ref Range    Lyme Disease Antibodies Total 0.19 <0.90   CBC with platelets differential     Status: None    Narrative    The following orders were created for panel order CBC with platelets differential.  Procedure                               Abnormality         Status                     ---------                               -----------         ------                     CBC with platelets and d...[[id number]]                      Final result                 Please view results for these tests on the individual orders.       Unfortunately, due to high volume of patients in the emergency department as well as numerous patients on schedule for outpatient MRI, MRI was delayed until approximately 1045 this morning.  He was stable in the ED throughout this duration.  MRI was performed, see results above.  No evidence of acute stroke, arachnoid cyst as previously noted on CT.  Reach back out to stroke neurology, spoke with Yani Michaels, nurse practitioner, who had already reviewed the images and does agree that the patient is negative for acute stroke.  At this point, it is reasonable to continue the previous suggestion of treatment for Bell's palsy with steroid and Valtrex, and will recommend outpatient follow-up with neurosurgery.  At this juncture, neurology would not have much to offer, but neurosurgery can follow the arachnoid cyst and make further recommendations.    Discussed the MRI findings as well as the recommendations of stroke neurology with Jesus.  He  remained stable in the ED and is tolerating medication without any problems.  He has no further questions at this time.  Understands the need for follow-up as well as the appropriate treatment with steroid and Valtrex.  Made a referral to neurosurgery on an outpatient basis.  Was provided with instructions about Bell's palsy and arachnoid cyst.  Provided with a work note.  Return ED precautions discussed.  Left in no acute distress     Dayanara Garcia,   01/12/23 1400

## 2023-01-16 NOTE — TELEPHONE ENCOUNTER
RECORDS RECEIVED FROM: internal   REASON FOR VISIT: hosp follow-up  Arachnoid cyst    Date of Appt: 2/1/23   NOTES (FOR ALL VISITS) STATUS DETAILS   OFFICE NOTE from referring provider Internal SEE INPATIENT NOTES   DISCHARGE REPORT from the ER Internal [facility]:  1/11/23-1/12/23   MEDICATION LIST Internal    IMAGING  (FOR ALL VISITS)     MRI (HEAD, NECK, SPINE) Internal [facility]:  MRI Brain 1/12/23   CT (HEAD, NECK, SPINE) Internal [facility]:  CTA Head Neck 1/11/23  CT Head 1/11/23

## 2023-01-31 NOTE — PROGRESS NOTES
[facility]      Name: Jesus Savage  : 1990  Referring provider: Dayanara Garcia  2023      Reason for visit:   Cerebellar arachnoid cyst, new patient visit    Dear Dr. Garcia,     It was a pleasure to see Mr. Savage in the [facility] today as a new patient.  As you recall, Mr. Savage is a 32 year old male who has a history of arachnoid cyst. He presented to ED 2023 with left-sided facial weakness, and CT revealed a arachnoid cyst in right posterior fossa. He was monitored overnight with MRI brain which was negative for acute stroke. He was treated with Valtrex and steroid for Bell's palsy. Today, he reports that his Bell's palsy resolved on Valtrex. He reports no imbalance or difficulty with coordination.     Review of Systems:   Pertinent items are noted in HPI or as in patient entered ROS below, remainder of complete ROS is negative.     Active Medications: None     Allergies: No known drug allergies     Past Medical History:   History reviewed. No pertinent past medical history.    Past Surgical History:   Right meniscus  UGI Endoscopy    Family History:   Hypertensions - Mother  Lupus - Father     Social History: He is from [place] and is a .     Physical Exam:   General: No acute distress.   Head: No signs of trauma.    Eyes: Conjunctivae are normal.  Mouth/Throat: Oropharynx moist.  Neck: Normal range of motion.    Resp: No respiratory distress.   MSK: Moves all extremities.  No obvious deformity.  Neuro: The patient is fully oriented and quite pleasant. Speech normal. Extraocular movements are intact without nystagmus. Facial sensation is intact in V1, V2, V3 distributions. Facial nerve function is symmetric, without synkinesis.  Palate is symmetric. Shoulders are full strength. Tongue is midline. There is no pronator drift. Full strength in all extremities. Sensation  intact throughout. No dysmetria with finger-nose-finger testing. Gait has is normal-based and patient is able to tandem walk.  Psych: Normal mood and affect. Behavior is normal.      Imaging:  We reviewed his recent MRI dated 1/12/2023. Imaging independently reviewed.    IMPRESSION:  1. No acute abnormality.  2. Large posterior fossa arachnoid cyst.    Laboratory:  NA     Assessment:  1. Cerebellar arachnoid cyst  2. Left sided Bell's palsy, resolved    Plan:  1. We reviewed the imaging findings and discussed management with surveillance and repeat imaging in 1 year to monitor for changes. He is in agreement with this plan.  2. We will plan to follow up in 1 year with repeat MRI.  3. We discussed monitoring for symptoms such as intense headache, imbalance, or coordination changes.  4. I encouraged him to contact us should any questions arise in advance of his next appointment.    It has been a pleasure to participate in the care of your patient. Please feel free to contact us if we may be of any assistance for Mr. Savage.      Delroy Stephen MD   Department of Neurosurgery  [facility]  [facility]         35 minutes spent on the date of the encounter doing chart review, review of outside records, review of test results, interpretation of tests, patient visit, documentation and discussion with other provider(s)      Scribe Disclosure:  Raghav AREVALO, am serving as a scribe to document services personally performed by Delroy Stephen MD at this visit, based upon the provider's statements to me. All documentation has been reviewed by the aforementioned provider prior to being entered into the official medical record.    Scribe Preparation Attestation:  Raghav AREVALO, a scribe, prepared the chart for today's encounter.

## 2023-02-01 ENCOUNTER — PRE VISIT (OUTPATIENT)
Dept: NEUROSURGERY | Facility: CLINIC | Age: 33
End: 2023-02-01

## 2023-02-01 ENCOUNTER — OFFICE VISIT (OUTPATIENT)
Dept: NEUROSURGERY | Facility: CLINIC | Age: 33
End: 2023-02-01
Attending: EMERGENCY MEDICINE
Payer: COMMERCIAL

## 2023-02-01 VITALS
SYSTOLIC BLOOD PRESSURE: 129 MMHG | HEART RATE: 84 BPM | OXYGEN SATURATION: 97 % | BODY MASS INDEX: 30.92 KG/M2 | WEIGHT: 218.4 LBS | DIASTOLIC BLOOD PRESSURE: 80 MMHG

## 2023-02-01 DIAGNOSIS — G93.0 ARACHNOID CYST: Primary | ICD-10-CM

## 2023-02-01 DIAGNOSIS — G93.0 CYST OF BRAIN: ICD-10-CM

## 2023-02-01 PROCEDURE — 99203 OFFICE O/P NEW LOW 30 MIN: CPT | Performed by: NEUROLOGICAL SURGERY

## 2023-02-01 ASSESSMENT — PAIN SCALES - GENERAL: PAINLEVEL: NO PAIN (0)

## 2023-02-01 NOTE — Clinical Note
2023       RE: Jesus Savage  [id number]  [facility] [address]     Dear Colleague,    Thank you for referring your patient, Jesus Savage, to the [facility] NEUROSURGERY [facility]INIC [place] [facility]. Please see a copy of my visit note below.      [facility]      Name: Jesus Savage  : 1990  Referring provider: Dayanara Garcia  2023      Reason for visit:   Cerebellar arachnoid cyst, new patient visit    Dear Dr. Garcia,     It was a pleasure to see Mr. Savage in the [facility] today as a new patient.  As you recall, Mr. Savage is a 32 year old male who has a history of arachnoid cyst. He presented to ED 2023 with left-sided facial weakness, and CT revealed a arachnoid cyst in right posterior fossa. He was monitored overnight with MRI brain which was negative for acute stroke. He was treated with Valtrex and steroid for Bell's palsy. Today, he reports that his Bell's palsy resolved on Valtrex. He reports no imbalance or difficulty with coordination.     Review of Systems:   Pertinent items are noted in HPI or as in patient entered ROS below, remainder of complete ROS is negative.     Active Medications: None     Allergies: No known drug allergies     Past Medical History:   History reviewed. No pertinent past medical history.    Past Surgical History:   Right meniscus  UGI Endoscopy    Family History:   Hypertensions - Mother  Lupus - Father     Social History: He is from [place] and is a .     Physical Exam:   General: No acute distress.   Head: No signs of trauma.    Eyes: Conjunctivae are normal.  Mouth/Throat: Oropharynx moist.  Neck: Normal range of motion.    Resp: No respiratory distress.   MSK: Moves all extremities.  No obvious deformity.  Neuro: The patient is fully oriented and quite pleasant.  Speech normal. Extraocular movements are intact without nystagmus. Facial sensation is intact in V1, V2, V3 distributions. Facial nerve function is symmetric, without synkinesis.  Palate is symmetric. Shoulders are full strength. Tongue is midline. There is no pronator drift. Full strength in all extremities. Sensation intact throughout. No dysmetria with finger-nose-finger testing. Gait has is normal-based and patient is able to tandem walk.  Psych: Normal mood and affect. Behavior is normal.      Imaging:  We reviewed his recent MRI dated 1/12/2023. Imaging independently reviewed.    IMPRESSION:  1. No acute abnormality.  2. Large posterior fossa arachnoid cyst.    Laboratory:  NA     Assessment:  1. Cerebellar arachnoid cyst  2. Left sided Bell's palsy, resolved    Plan:  1. We reviewed the imaging findings and discussed management with surveillance and repeat imaging in 1 year to monitor for changes. He is in agreement with this plan.  2. We will plan to follow up in 1 year with repeat MRI.  3. We discussed monitoring for symptoms such as intense headache, imbalance, or coordination changes.  4. I encouraged him to contact us should any questions arise in advance of his next appointment.    It has been a pleasure to participate in the care of your patient. Please feel free to contact us if we may be of any assistance for Mr. Savage.      Delroy Stephen MD   Department of Neurosurgery  [facility]  [facility]         35 minutes spent on the date of the encounter doing chart review, review of outside records, review of test results, interpretation of tests, patient visit, documentation and discussion with other provider(s)      Scribe Disclosure:  I, Raghav Esteves, am serving as a scribe to document services personally performed by Delroy Stephen MD at this visit, based upon the provider's statements to me. All documentation has been reviewed by the  aforementioned provider prior to being entered into the official medical record.    Scribe Preparation Attestation:  I, Raghav Esteves, a scribe, prepared the chart for today's encounter.           Again, thank you for allowing me to participate in the care of your patient.      Sincerely,    Delroy Stephen MD

## 2023-02-01 NOTE — PATIENT INSTRUCTIONS
Thank you for choosing [facility]. You were seen in the Neurosurgery Clinic today with Dr. Stephen.    Next steps:  Return to clinic in 1 year for follow up with DHIRAJ.  Complete MRI scan prior to appointment.      Please don't hesitate to reach out via MyChart or telephone if you have any questions after your visit.    Harini Mtz RN Care Coordinator, Neurosurgery    Direct: [phone]  Neurosurgery Clinic: [phone]

## 2023-02-01 NOTE — LETTER
[place] [facility]  [facility] NEUROSURGERY CLINIC [address]  [address]RD FLO[address]  [facility] [address]  Phone: [phone]  Fax: [phone]          2023    RE:   Jesus Savage  [phone]  [facility] [address]      Dear Colleague,    Thank you for referring your patient, Jesus Savage, to the [facility]. Please see a copy of my visit note below.        [facility]      Name: Jesus Savage  : 1990  Referring provider: Dayanara Garcia  2023      Reason for visit:   Cerebellar arachnoid cyst, new patient visit    Dear Dr. Garcia,     It was a pleasure to see Mr. Savage in the [facility] today as a new patient.  As you recall, Mr. Savage is a 32 year old male who has a history of arachnoid cyst. He presented to ED 2023 with left-sided facial weakness, and CT revealed a arachnoid cyst in right posterior fossa. He was monitored overnight with MRI brain which was negative for acute stroke. He was treated with Valtrex and steroid for Bell's palsy. Today, he reports that his Bell's palsy resolved on Valtrex. He reports no imbalance or difficulty with coordination.     Review of Systems:   Pertinent items are noted in HPI or as in patient entered ROS below, remainder of complete ROS is negative.     Active Medications: None     Allergies: No known drug allergies     Past Medical History:   History reviewed. No pertinent past medical history.    Past Surgical History:   Right meniscus  UGI Endoscopy    Family History:   Hypertensions - Mother  Lupus - Father     Social History: He is from [place] and is a .     Physical Exam:   General: No acute distress.   Head: No signs of trauma.    Eyes: Conjunctivae are normal.  Mouth/Throat: Oropharynx moist.  Neck: Normal range of motion.     Resp: No respiratory distress.   MSK: Moves all extremities.  No obvious deformity.  Neuro: The patient is fully oriented and quite pleasant. Speech normal. Extraocular movements are intact without nystagmus. Facial sensation is intact in V1, V2, V3 distributions. Facial nerve function is symmetric, without synkinesis.  Palate is symmetric. Shoulders are full strength. Tongue is midline. There is no pronator drift. Full strength in all extremities. Sensation intact throughout. No dysmetria with finger-nose-finger testing. Gait has is normal-based and patient is able to tandem walk.  Psych: Normal mood and affect. Behavior is normal.      Imaging:  We reviewed his recent MRI dated 1/12/2023. Imaging independently reviewed.    IMPRESSION:  1. No acute abnormality.  2. Large posterior fossa arachnoid cyst.    Laboratory:  NA     Assessment:  1. Cerebellar arachnoid cyst  2. Left sided Bell's palsy, resolved    Plan:  1. We reviewed the imaging findings and discussed management with surveillance and repeat imaging in 1 year to monitor for changes. He is in agreement with this plan.  2. We will plan to follow up in 1 year with repeat MRI.  3. We discussed monitoring for symptoms such as intense headache, imbalance, or coordination changes.  4. I encouraged him to contact us should any questions arise in advance of his next appointment.    It has been a pleasure to participate in the care of your patient. Please feel free to contact us if we may be of any assistance for Mr. Savage.      Delroy Stephen MD   Department of Neurosurgery  [facility]  [facility]         35 minutes spent on the date of the encounter doing chart review, review of outside records, review of test results, interpretation of tests, patient visit, documentation and discussion with other provider(s)      Scribe Disclosure:  I, Raghav Esteves, am serving as a scribe to document services personally  performed by Delroy Stephen MD at this visit, based upon the provider's statements to me. All documentation has been reviewed by the aforementioned provider prior to being entered into the official medical record.    Scribe Preparation Attestation:  I, Raghav Esteves, a scribe, prepared the chart for today's encounter.

## 2023-03-13 ENCOUNTER — HOSPITAL ENCOUNTER (EMERGENCY)
Facility: CLINIC | Age: 33
Discharge: HOME | End: 2023-03-13
Attending: PHYSICIAN ASSISTANT | Admitting: PHYSICIAN ASSISTANT
Payer: COMMERCIAL

## 2023-03-13 VITALS
HEART RATE: 89 BPM | BODY MASS INDEX: 30.06 KG/M2 | OXYGEN SATURATION: 98 % | DIASTOLIC BLOOD PRESSURE: 71 MMHG | SYSTOLIC BLOOD PRESSURE: 107 MMHG | RESPIRATION RATE: 18 BRPM | WEIGHT: 210 LBS | HEIGHT: 70 IN | TEMPERATURE: 98.6 F

## 2023-03-13 DIAGNOSIS — A08.4 VIRAL GASTROENTERITIS: ICD-10-CM

## 2023-03-13 LAB
ALBUMIN SERPL BCG-MCNC: 5.5 G/DL (ref 3.5–5.2)
ALBUMIN UR-MCNC: 30 MG/DL
ALP SERPL-CCNC: 96 U/L (ref 40–129)
ALT SERPL W P-5'-P-CCNC: 28 U/L (ref 10–50)
ANION GAP SERPL CALCULATED.3IONS-SCNC: 16 MMOL/L (ref 7–15)
APPEARANCE UR: ABNORMAL
AST SERPL W P-5'-P-CCNC: 29 U/L (ref 10–50)
BASOPHILS # BLD AUTO: 0 10E3/UL (ref 0–0.2)
BASOPHILS NFR BLD AUTO: 0 %
BILIRUB SERPL-MCNC: 0.9 MG/DL
BILIRUB UR QL STRIP: NEGATIVE
BUN SERPL-MCNC: 20.3 MG/DL (ref 6–20)
CALCIUM SERPL-MCNC: 10.1 MG/DL (ref 8.6–10)
CHLORIDE SERPL-SCNC: 99 MMOL/L (ref 98–107)
COLOR UR AUTO: YELLOW
CREAT SERPL-MCNC: 1.02 MG/DL (ref 0.67–1.17)
DEPRECATED HCO3 PLAS-SCNC: 23 MMOL/L (ref 22–29)
EOSINOPHIL # BLD AUTO: 0 10E3/UL (ref 0–0.7)
EOSINOPHIL NFR BLD AUTO: 0 %
ERYTHROCYTE [DISTWIDTH] IN BLOOD BY AUTOMATED COUNT: 12.3 % (ref 10–15)
GFR SERPL CREATININE-BSD FRML MDRD: >90 ML/MIN/1.73M2
GLUCOSE SERPL-MCNC: 114 MG/DL (ref 70–99)
GLUCOSE UR STRIP-MCNC: NEGATIVE MG/DL
HCT VFR BLD AUTO: 51.5 % (ref 40–53)
HGB BLD-MCNC: 18.4 G/DL (ref 13.3–17.7)
HGB UR QL STRIP: NEGATIVE
HYALINE CASTS: 83 /LPF
IMM GRANULOCYTES # BLD: 0.1 10E3/UL
IMM GRANULOCYTES NFR BLD: 0 %
KETONES UR STRIP-MCNC: 20 MG/DL
LEUKOCYTE ESTERASE UR QL STRIP: NEGATIVE
LIPASE SERPL-CCNC: 22 U/L (ref 13–60)
LYMPHOCYTES # BLD AUTO: 0.4 10E3/UL (ref 0.8–5.3)
LYMPHOCYTES NFR BLD AUTO: 3 %
MCH RBC QN AUTO: 29.7 PG (ref 26.5–33)
MCHC RBC AUTO-ENTMCNC: 35.7 G/DL (ref 31.5–36.5)
MCV RBC AUTO: 83 FL (ref 78–100)
MONOCYTES # BLD AUTO: 0.8 10E3/UL (ref 0–1.3)
MONOCYTES NFR BLD AUTO: 5 %
MUCOUS THREADS #/AREA URNS LPF: PRESENT /LPF
NEUTROPHILS # BLD AUTO: 14 10E3/UL (ref 1.6–8.3)
NEUTROPHILS NFR BLD AUTO: 92 %
NITRATE UR QL: NEGATIVE
NRBC # BLD AUTO: 0 10E3/UL
NRBC BLD AUTO-RTO: 0 /100
PH UR STRIP: 5 [PH] (ref 5–7)
PLATELET # BLD AUTO: 279 10E3/UL (ref 150–450)
POTASSIUM SERPL-SCNC: 4.4 MMOL/L (ref 3.4–5.3)
PROT SERPL-MCNC: 8.3 G/DL (ref 6.4–8.3)
RBC # BLD AUTO: 6.19 10E6/UL (ref 4.4–5.9)
RBC URINE: 0 /HPF
SODIUM SERPL-SCNC: 138 MMOL/L (ref 136–145)
SP GR UR STRIP: 1.03 (ref 1–1.03)
SQUAMOUS EPITHELIAL: <1 /HPF
UROBILINOGEN UR STRIP-MCNC: NORMAL MG/DL
WBC # BLD AUTO: 15.3 10E3/UL (ref 4–11)
WBC URINE: 2 /HPF

## 2023-03-13 PROCEDURE — 81001 URINALYSIS AUTO W/SCOPE: CPT | Performed by: PHYSICIAN ASSISTANT

## 2023-03-13 PROCEDURE — 258N000003 HC RX IP 258 OP 636: Performed by: PHYSICIAN ASSISTANT

## 2023-03-13 PROCEDURE — 36415 COLL VENOUS BLD VENIPUNCTURE: CPT | Performed by: PHYSICIAN ASSISTANT

## 2023-03-13 PROCEDURE — 83690 ASSAY OF LIPASE: CPT | Performed by: PHYSICIAN ASSISTANT

## 2023-03-13 PROCEDURE — 96375 TX/PRO/DX INJ NEW DRUG ADDON: CPT

## 2023-03-13 PROCEDURE — 80053 COMPREHEN METABOLIC PANEL: CPT | Performed by: PHYSICIAN ASSISTANT

## 2023-03-13 PROCEDURE — 99284 EMERGENCY DEPT VISIT MOD MDM: CPT | Mod: 25

## 2023-03-13 PROCEDURE — 96374 THER/PROPH/DIAG INJ IV PUSH: CPT

## 2023-03-13 PROCEDURE — 96361 HYDRATE IV INFUSION ADD-ON: CPT

## 2023-03-13 PROCEDURE — 85004 AUTOMATED DIFF WBC COUNT: CPT | Performed by: PHYSICIAN ASSISTANT

## 2023-03-13 PROCEDURE — 250N000011 HC RX IP 250 OP 636: Performed by: PHYSICIAN ASSISTANT

## 2023-03-13 PROCEDURE — 99284 EMERGENCY DEPT VISIT MOD MDM: CPT | Performed by: PHYSICIAN ASSISTANT

## 2023-03-13 RX ORDER — ONDANSETRON 4 MG/1
4 TABLET, ORALLY DISINTEGRATING ORAL EVERY 8 HOURS PRN
Qty: 20 TABLET | Refills: 0 | Status: SHIPPED | OUTPATIENT
Start: 2023-03-13 | End: 2023-07-13

## 2023-03-13 RX ORDER — KETOROLAC TROMETHAMINE 15 MG/ML
15 INJECTION, SOLUTION INTRAMUSCULAR; INTRAVENOUS ONCE
Status: COMPLETED | OUTPATIENT
Start: 2023-03-13 | End: 2023-03-13

## 2023-03-13 RX ORDER — SODIUM CHLORIDE 9 MG/ML
INJECTION, SOLUTION INTRAVENOUS CONTINUOUS
Status: DISCONTINUED | OUTPATIENT
Start: 2023-03-13 | End: 2023-03-13 | Stop reason: HOSPADM

## 2023-03-13 RX ORDER — ONDANSETRON 2 MG/ML
4 INJECTION INTRAMUSCULAR; INTRAVENOUS EVERY 30 MIN PRN
Status: DISCONTINUED | OUTPATIENT
Start: 2023-03-13 | End: 2023-03-13 | Stop reason: HOSPADM

## 2023-03-13 RX ADMIN — SODIUM CHLORIDE 1000 ML: 9 INJECTION, SOLUTION INTRAVENOUS at 16:42

## 2023-03-13 RX ADMIN — ONDANSETRON 4 MG: 2 INJECTION INTRAMUSCULAR; INTRAVENOUS at 15:55

## 2023-03-13 RX ADMIN — SODIUM CHLORIDE 1000 ML: 9 INJECTION, SOLUTION INTRAVENOUS at 15:53

## 2023-03-13 RX ADMIN — KETOROLAC TROMETHAMINE 15 MG: 15 INJECTION, SOLUTION INTRAMUSCULAR; INTRAVENOUS at 15:58

## 2023-03-13 ASSESSMENT — ACTIVITIES OF DAILY LIVING (ADL)
ADLS_ACUITY_SCORE: 35
ADLS_ACUITY_SCORE: 35

## 2023-03-13 NOTE — DISCHARGE INSTRUCTIONS
I think you have some sort of stomach bug causing your vomiting and diarrhea.  Please practice bowel rest, with a clear liquid diet for the next 24 hours.  Advance your diet slowly as tolerated after that.  Use the Zofran prescribed for nausea as needed.  Try to keep up with fluids to stay hydrated.  If you do have any worsening symptoms please return to the emergency department.    Thank you for choosing [facility]'s Emergency Department. It was a pleasure taking care of you today. If you have any questions, please call [phone].    Trudy Villa PA-C

## 2023-03-13 NOTE — ED PROVIDER NOTES
History     Chief Complaint   Patient presents with     Nausea, Vomiting, & Diarrhea     HPI  Jesus Savage is a 33 year old male who presents to the emergency department complaining of nausea, vomiting, and diarrhea.  Around 4 AM the patient woke up with diarrhea and has had multiple episodes throughout the day today.  Shortly after the diarrhea he started having some vomiting.  Anything he attempts to take in orally comes right back up.  He has not taken anything for his symptoms.  There has been no blood in the vomit or stool.  He complains of feeling achy but denies any fevers.  Denies any abdominal pain.  No one around him is sick.  He felt fine yesterday.        Allergies:  Allergies   Allergen Reactions     No Known Drug Allergies        Problem List:    Patient Active Problem List    Diagnosis Date Noted     Urticaria 09/21/2006     Priority: Medium     Problem list name updated by automated process. Provider to review          Past Medical History:    History reviewed. No pertinent past medical history.    Past Surgical History:    Past Surgical History:   Procedure Laterality Date     SOFT TISSUE SURGERY  2010    Right Meniscus     [facility] UGI ENDOSCOPY, SIMPLE EXAM  05/28/2009     [facility] UGI ENDOSCOPY, SIMPLE EXAM  05/12/2014       Family History:    Family History   Problem Relation Age of Onset     Hypertension Mother      Lupus Father      C.A.D. No family hx of      Diabetes No family hx of      Cancer - colorectal No family hx of        Social History:  Marital Status:   [2]  Social History     Tobacco Use     Smoking status: Never     Smokeless tobacco: Never   Vaping Use     Vaping Use: Never used   Substance Use Topics     Alcohol use: No     Drug use: No        Medications:    ondansetron (ZOFRAN ODT) 4 MG ODT tab  predniSONE (DELTASONE) 20 MG tablet  valACYclovir (VALTREX) 1000 mg tablet          Review of Systems   All other systems reviewed and are negative.        Physical Exam   BP:  "139/89  Pulse: (!) 122  Temp: 98.6  F (37  C)  Resp: 18  Height: 177.8 cm (5' 10\")  Weight: 95.3 kg (210 lb)  SpO2: 98 %      Physical Exam  Vitals and nursing note reviewed.   Constitutional:       General: He is not in acute distress.     Appearance: He is well-developed. He is not toxic-appearing or diaphoretic.      Comments: Appears fatigued   HENT:      Head: Normocephalic and atraumatic.   Eyes:      Conjunctiva/sclera: Conjunctivae normal.      Pupils: Pupils are equal, round, and reactive to light.   Cardiovascular:      Rate and Rhythm: Regular rhythm. Tachycardia present.      Heart sounds: Normal heart sounds.   Pulmonary:      Effort: Pulmonary effort is normal. No respiratory distress.      Breath sounds: Normal breath sounds.   Abdominal:      General: Bowel sounds are normal. There is no distension.      Palpations: Abdomen is soft.      Tenderness: There is no abdominal tenderness. There is no right CVA tenderness or left CVA tenderness.   Musculoskeletal:         General: No deformity.      Cervical back: Neck supple. No tenderness.      Thoracic back: No tenderness.      Lumbar back: No tenderness.   Skin:     General: Skin is warm and dry.   Neurological:      General: No focal deficit present.      Mental Status: He is alert and oriented to person, place, and time. Mental status is at baseline.      Coordination: Coordination normal.   Psychiatric:         Mood and Affect: Mood normal.         Behavior: Behavior normal.            ED Course           Procedures      Results for orders placed or performed during the hospital encounter of 03/13/23 (from the past 24 hour(s))   CBC with platelets differential    Narrative    The following orders were created for panel order CBC with platelets differential.  Procedure                               Abnormality         Status                     ---------                               -----------         ------                     CBC with platelets " and d...[[id number]]  Abnormal            Final result                 Please view results for these tests on the individual orders.   Comprehensive metabolic panel   Result Value Ref Range    Sodium 138 136 - 145 mmol/L    Potassium 4.4 3.4 - 5.3 mmol/L    Chloride 99 98 - 107 mmol/L    Carbon Dioxide (CO2) 23 22 - 29 mmol/L    Anion Gap 16 (H) 7 - 15 mmol/L    Urea Nitrogen 20.3 (H) 6.0 - 20.0 mg/dL    Creatinine 1.02 0.67 - 1.17 mg/dL    Calcium 10.1 (H) 8.6 - 10.0 mg/dL    Glucose 114 (H) 70 - 99 mg/dL    Alkaline Phosphatase 96 40 - 129 U/L    AST 29 10 - 50 U/L    ALT 28 10 - 50 U/L    Protein Total 8.3 6.4 - 8.3 g/dL    Albumin 5.5 (H) 3.5 - 5.2 g/dL    Bilirubin Total 0.9 <=1.2 mg/dL    GFR Estimate >90 >60 mL/min/1.73m2   Lipase   Result Value Ref Range    Lipase 22 13 - 60 U/L   CBC with platelets and differential   Result Value Ref Range    WBC Count 15.3 (H) 4.0 - 11.0 10e3/uL    RBC Count 6.19 (H) 4.40 - 5.90 10e6/uL    Hemoglobin 18.4 (H) 13.3 - 17.7 g/dL    Hematocrit 51.5 40.0 - 53.0 %    MCV 83 78 - 100 fL    MCH 29.7 26.5 - 33.0 pg    MCHC 35.7 31.5 - 36.5 g/dL    RDW 12.3 10.0 - 15.0 %    Platelet Count 279 150 - 450 10e3/uL    % Neutrophils 92 %    % Lymphocytes 3 %    % Monocytes 5 %    % Eosinophils 0 %    % Basophils 0 %    % Immature Granulocytes 0 %    NRBCs per 100 WBC 0 <1 /100    Absolute Neutrophils 14.0 (H) 1.6 - 8.3 10e3/uL    Absolute Lymphocytes 0.4 (L) 0.8 - 5.3 10e3/uL    Absolute Monocytes 0.8 0.0 - 1.3 10e3/uL    Absolute Eosinophils 0.0 0.0 - 0.7 10e3/uL    Absolute Basophils 0.0 0.0 - 0.2 10e3/uL    Absolute Immature Granulocytes 0.1 <=0.4 10e3/uL    Absolute NRBCs 0.0 10e3/uL       Medications   0.9% sodium chloride BOLUS (0 mLs Intravenous Stopped 3/13/23 1640)     Followed by   0.9% sodium chloride BOLUS (0 mLs Intravenous Stopped 3/13/23 1729)     Followed by   sodium chloride 0.9% infusion (has no administration in time range)   ondansetron (ZOFRAN) injection 4 mg (4 mg  Intravenous $Given 3/13/23 7491)   0.9% sodium chloride BOLUS (has no administration in time range)   ketorolac (TORADOL) injection 15 mg (15 mg Intravenous $Given 3/13/23 6055)          Assessments & Plan (with Medical Decision Making)  Jesus Savage is a 33 year old male who presented to the ED complaining of vomiting and diarrhea that started early this morning.  Denies fevers or abdominal pain.  On arrival to the ED his vital signs were within normal limits.  He did not appear acutely toxic.  Abdominal exam benign today.  Based on symptomology, suspect  gastroenteritis, likely viral.  Patient was given 2 L bolus of fluids along with Zofran and Toradol and reported feeling much improved.  His white count was elevated at 15.3, as was his hemoglobin at 18.4, suspect some hemoconcentration there.  His BUN is also elevated at 20.3.  Creatinine within normal limits however and he had no acute electrolyte abnormalities.  Liver enzymes and lipase normal.  I went over these results with the patient.  With his benign abdomen I did not think any imaging warranted.  He was feeling improved with symptomatic therapies here and comfortable with plan to manage things at home going forward.  He will be prescribed Zofran to be used as needed.  Advised sticking to a clear liquid diet for the next 24 hours and advancing diet slowly as tolerated.  He was provided instructions on when to return to the ED.  All questions answered and patient discharged home in suitable condition.     I have reviewed the nursing notes.    I have reviewed the findings, diagnosis, plan and need for follow up with the patient.      New Prescriptions    ONDANSETRON (ZOFRAN ODT) 4 MG ODT TAB    Take 1 tablet (4 mg) by mouth every 8 hours as needed for nausea       Final diagnoses:   Viral gastroenteritis     Note: Chart documentation done in part with Dragon Voice Recognition software. Although reviewed after completion, some word and grammatical errors  may remain.     3/13/2023   [facility] EMERGENCY DEPT     Trudy Villa PA-C  03/13/23 2049

## 2023-03-13 NOTE — ED TRIAGE NOTES
Reports N/V/D since 0400.     Triage Assessment     Row Name 03/13/23 1412       Triage Assessment (Adult)    Airway WDL WDL       Respiratory WDL    Respiratory WDL WDL       Skin Circulation/Temperature WDL    Skin Circulation/Temperature WDL WDL       Cardiac WDL    Cardiac WDL WDL       Peripheral/Neurovascular WDL    Peripheral Neurovascular WDL WDL       Cognitive/Neuro/Behavioral WDL    Cognitive/Neuro/Behavioral WDL WDL

## 2023-03-13 NOTE — ED NOTES
Pt reports around 0400 this am he started with nausea and vomiting and diarrhea, he was having bouts about every 15 min. Having complaint of low back discomfort as well, denies abdominal pain, he reports a lot of belching lately as well, reports if he attempts any intake he either vomits or throws up

## 2023-05-30 ENCOUNTER — PATIENT OUTREACH (OUTPATIENT)
Dept: CARE COORDINATION | Facility: CLINIC | Age: 33
End: 2023-05-30
Payer: COMMERCIAL

## 2023-07-11 ASSESSMENT — ENCOUNTER SYMPTOMS
EYE PAIN: 0
COUGH: 0
WEAKNESS: 0
HEMATOCHEZIA: 0
SHORTNESS OF BREATH: 0
HEMATURIA: 0
HEADACHES: 0
CHILLS: 0
ARTHRALGIAS: 0
DIARRHEA: 0
PALPITATIONS: 0
SORE THROAT: 0
JOINT SWELLING: 0
DIZZINESS: 0
PARESTHESIAS: 0
CONSTIPATION: 0
HEARTBURN: 0
MYALGIAS: 0
DYSURIA: 0
NERVOUS/ANXIOUS: 0
NAUSEA: 0
ABDOMINAL PAIN: 0
FREQUENCY: 0
FEVER: 0

## 2023-07-13 ENCOUNTER — OFFICE VISIT (OUTPATIENT)
Dept: FAMILY MEDICINE | Facility: CLINIC | Age: 33
End: 2023-07-13
Payer: COMMERCIAL

## 2023-07-13 VITALS
HEIGHT: 71 IN | SYSTOLIC BLOOD PRESSURE: 110 MMHG | HEART RATE: 72 BPM | DIASTOLIC BLOOD PRESSURE: 68 MMHG | OXYGEN SATURATION: 98 % | WEIGHT: 211.3 LBS | BODY MASS INDEX: 29.58 KG/M2 | TEMPERATURE: 96.6 F | RESPIRATION RATE: 16 BRPM

## 2023-07-13 DIAGNOSIS — R79.89 LOW TSH LEVEL: ICD-10-CM

## 2023-07-13 DIAGNOSIS — Z00.00 ROUTINE GENERAL MEDICAL EXAMINATION AT A HEALTH CARE FACILITY: Primary | ICD-10-CM

## 2023-07-13 DIAGNOSIS — Z11.59 NEED FOR HEPATITIS C SCREENING TEST: ICD-10-CM

## 2023-07-13 DIAGNOSIS — F33.0 MILD RECURRENT MAJOR DEPRESSION (H): ICD-10-CM

## 2023-07-13 DIAGNOSIS — Z13.220 LIPID SCREENING: ICD-10-CM

## 2023-07-13 DIAGNOSIS — G93.0 ARACHNOID CYST: ICD-10-CM

## 2023-07-13 LAB
ANION GAP SERPL CALCULATED.3IONS-SCNC: 13 MMOL/L (ref 7–15)
BUN SERPL-MCNC: 9.4 MG/DL (ref 6–20)
CALCIUM SERPL-MCNC: 9.4 MG/DL (ref 8.6–10)
CHLORIDE SERPL-SCNC: 105 MMOL/L (ref 98–107)
CHOLEST SERPL-MCNC: 145 MG/DL
CREAT SERPL-MCNC: 0.8 MG/DL (ref 0.67–1.17)
DEPRECATED HCO3 PLAS-SCNC: 25 MMOL/L (ref 22–29)
ERYTHROCYTE [DISTWIDTH] IN BLOOD BY AUTOMATED COUNT: 12.4 % (ref 10–15)
GFR SERPL CREATININE-BSD FRML MDRD: >90 ML/MIN/1.73M2
GLUCOSE SERPL-MCNC: 94 MG/DL (ref 70–99)
HCT VFR BLD AUTO: 43.8 % (ref 40–53)
HCV AB SERPL QL IA: NONREACTIVE
HDLC SERPL-MCNC: 41 MG/DL
HGB BLD-MCNC: 15.5 G/DL (ref 13.3–17.7)
LDLC SERPL CALC-MCNC: 74 MG/DL
MCH RBC QN AUTO: 29.6 PG (ref 26.5–33)
MCHC RBC AUTO-ENTMCNC: 35.4 G/DL (ref 31.5–36.5)
MCV RBC AUTO: 84 FL (ref 78–100)
NONHDLC SERPL-MCNC: 104 MG/DL
PLATELET # BLD AUTO: 236 10E3/UL (ref 150–450)
POTASSIUM SERPL-SCNC: 4.1 MMOL/L (ref 3.4–5.3)
RBC # BLD AUTO: 5.23 10E6/UL (ref 4.4–5.9)
SODIUM SERPL-SCNC: 143 MMOL/L (ref 136–145)
TRIGL SERPL-MCNC: 151 MG/DL
TSH SERPL DL<=0.005 MIU/L-ACNC: 0.32 UIU/ML (ref 0.3–4.2)
WBC # BLD AUTO: 5.9 10E3/UL (ref 4–11)

## 2023-07-13 PROCEDURE — 84443 ASSAY THYROID STIM HORMONE: CPT | Performed by: STUDENT IN AN ORGANIZED HEALTH CARE EDUCATION/TRAINING PROGRAM

## 2023-07-13 PROCEDURE — 90715 TDAP VACCINE 7 YRS/> IM: CPT | Performed by: STUDENT IN AN ORGANIZED HEALTH CARE EDUCATION/TRAINING PROGRAM

## 2023-07-13 PROCEDURE — 85027 COMPLETE CBC AUTOMATED: CPT | Performed by: STUDENT IN AN ORGANIZED HEALTH CARE EDUCATION/TRAINING PROGRAM

## 2023-07-13 PROCEDURE — 36415 COLL VENOUS BLD VENIPUNCTURE: CPT | Performed by: STUDENT IN AN ORGANIZED HEALTH CARE EDUCATION/TRAINING PROGRAM

## 2023-07-13 PROCEDURE — 80061 LIPID PANEL: CPT | Performed by: STUDENT IN AN ORGANIZED HEALTH CARE EDUCATION/TRAINING PROGRAM

## 2023-07-13 PROCEDURE — 90471 IMMUNIZATION ADMIN: CPT | Performed by: STUDENT IN AN ORGANIZED HEALTH CARE EDUCATION/TRAINING PROGRAM

## 2023-07-13 PROCEDURE — 99395 PREV VISIT EST AGE 18-39: CPT | Mod: 25 | Performed by: STUDENT IN AN ORGANIZED HEALTH CARE EDUCATION/TRAINING PROGRAM

## 2023-07-13 PROCEDURE — 80048 BASIC METABOLIC PNL TOTAL CA: CPT | Performed by: STUDENT IN AN ORGANIZED HEALTH CARE EDUCATION/TRAINING PROGRAM

## 2023-07-13 PROCEDURE — 86803 HEPATITIS C AB TEST: CPT | Performed by: STUDENT IN AN ORGANIZED HEALTH CARE EDUCATION/TRAINING PROGRAM

## 2023-07-13 PROCEDURE — 99214 OFFICE O/P EST MOD 30 MIN: CPT | Mod: 25 | Performed by: STUDENT IN AN ORGANIZED HEALTH CARE EDUCATION/TRAINING PROGRAM

## 2023-07-13 RX ORDER — BUPROPION HYDROCHLORIDE 150 MG/1
150 TABLET ORAL EVERY MORNING
Qty: 90 TABLET | Refills: 1 | Status: SHIPPED | OUTPATIENT
Start: 2023-07-13 | End: 2024-08-22

## 2023-07-13 ASSESSMENT — ENCOUNTER SYMPTOMS
COUGH: 0
WEAKNESS: 0
EYE PAIN: 0
HEMATOCHEZIA: 0
DYSURIA: 0
FREQUENCY: 0
SHORTNESS OF BREATH: 0
HEADACHES: 0
FEVER: 0
PARESTHESIAS: 0
DIZZINESS: 0
NERVOUS/ANXIOUS: 0
SORE THROAT: 0
CHILLS: 0
ARTHRALGIAS: 0
NAUSEA: 0
DIARRHEA: 0
ABDOMINAL PAIN: 0
HEARTBURN: 0
HEMATURIA: 0
PALPITATIONS: 0
JOINT SWELLING: 0
MYALGIAS: 0
CONSTIPATION: 0

## 2023-07-13 ASSESSMENT — PATIENT HEALTH QUESTIONNAIRE - PHQ9
SUM OF ALL RESPONSES TO PHQ QUESTIONS 1-9: 9
SUM OF ALL RESPONSES TO PHQ QUESTIONS 1-9: 9
10. IF YOU CHECKED OFF ANY PROBLEMS, HOW DIFFICULT HAVE THESE PROBLEMS MADE IT FOR YOU TO DO YOUR WORK, TAKE CARE OF THINGS AT HOME, OR GET ALONG WITH OTHER PEOPLE: SOMEWHAT DIFFICULT

## 2023-07-13 ASSESSMENT — PAIN SCALES - GENERAL: PAINLEVEL: NO PAIN (0)

## 2023-07-13 NOTE — PROGRESS NOTES
Prior to immunization administration, verified patients identity using patient s name and date of birth. Please see Immunization Activity for additional information.     Screening Questionnaire for Adult Immunization    Are you sick today?   No   Do you have allergies to medications, food, a vaccine component or latex?   No   Have you ever had a serious reaction after receiving a vaccination?   No   Do you have a long-term health problem with heart, lung, kidney, or metabolic disease (e.g., diabetes), asthma, a blood disorder, no spleen, complement component deficiency, a cochlear implant, or a spinal fluid leak?  Are you on long-term aspirin therapy?   No   Do you have cancer, leukemia, HIV/AIDS, or any other immune system problem?   No   Do you have a parent, brother, or sister with an immune system problem?   Yes   In the past 3 months, have you taken medications that affect  your immune system, such as prednisone, other steroids, or anticancer drugs; drugs for the treatment of rheumatoid arthritis, Crohn s disease, or psoriasis; or have you had radiation treatments?   No   Have you had a seizure, or a brain or other nervous system problem?   No   During the past year, have you received a transfusion of blood or blood    products, or been given immune (gamma) globulin or antiviral drug?   No   For women: Are you pregnant or is there a chance you could become       pregnant during the next month?   No   Have you received any vaccinations in the past 4 weeks?   No     Immunization questionnaire was positive for at least one answer.  Notified .      Patient instructed to remain in clinic for 15 minutes afterwards, and to report any adverse reactions.     Screening performed by Fadia Kuhn MA on 7/13/2023 at 10:45 AM.

## 2023-07-13 NOTE — PROGRESS NOTES
SUBJECTIVE:   CC: Jesus is an 33 year old who presents for preventative health visit.       7/13/2023    10:05 AM   Additional Questions   Roomed by Katelynn LE     Healthy Habits:     Getting at least 3 servings of Calcium per day:  Yes    Bi-annual eye exam:  NO    Dental care twice a year:  Yes    Sleep apnea or symptoms of sleep apnea:  None    Diet:  Regular (no restrictions)    Frequency of exercise:  2-3 days/week    Duration of exercise:  45-60 minutes    Taking medications regularly:  Yes    Medication side effects:  Not applicable    Additional concerns today:  No    Patient continues to have energy issues as well as motivation.  Did stop fluoxetine as he was unsure if this is making a difference.  Did have dose increase previously.  Was seen with labs in March with illness.    Today's PHQ-9 Score:       7/13/2023     9:53 AM   PHQ-9 SCORE   PHQ-9 Total Score MyChart 9 (Mild depression)   PHQ-9 Total Score 9                   Social History     Tobacco Use     Smoking status: Never     Smokeless tobacco: Never   Substance Use Topics     Alcohol use: No             7/11/2023    12:09 AM   Alcohol Use   Prescreen: >3 drinks/day or >7 drinks/week? No       Last PSA: No results found for: PSA    Reviewed orders with patient. Reviewed health maintenance and updated orders accordingly - Yes  Lab work is in process    Reviewed and updated as needed this visit by clinical staff   Tobacco  Allergies  Meds              Reviewed and updated as needed this visit by Provider     Meds             Past Medical History:   Diagnosis Date     Depressive disorder 06/2022      Past Surgical History:   Procedure Laterality Date     SOFT TISSUE SURGERY  2010    Right Meniscus     [facility] UGI ENDOSCOPY, SIMPLE EXAM  05/28/2009     [facility] UGI ENDOSCOPY, SIMPLE EXAM  05/12/2014       Review of Systems   Constitutional: Negative for chills and fever.   HENT: Negative for congestion, ear pain, hearing loss and sore throat.    Eyes:  "Negative for pain and visual disturbance.   Respiratory: Negative for cough and shortness of breath.    Cardiovascular: Negative for chest pain, palpitations and peripheral edema.   Gastrointestinal: Negative for abdominal pain, constipation, diarrhea, heartburn, hematochezia and nausea.   Genitourinary: Negative for dysuria, frequency, genital sores, hematuria, impotence, penile discharge and urgency.   Musculoskeletal: Negative for arthralgias, joint swelling and myalgias.   Neurological: Negative for dizziness, weakness, headaches and paresthesias.   Psychiatric/Behavioral: Negative for mood changes. The patient is not nervous/anxious.        OBJECTIVE:   /68 (BP Location: Left arm, Patient Position: Chair)   Pulse 72   Temp (!) 96.6  F (35.9  C) (Temporal)   Resp 16   Ht 1.797 m (5' 10.75\")   Wt 95.8 kg (211 lb 4.8 oz)   SpO2 98%   BMI 29.68 kg/m      Physical Exam  GENERAL: healthy, alert and no distress  EYES: Eyes grossly normal to inspection, PERRL and conjunctivae and sclerae normal  HENT: ear canals and TM's normal, nose and mouth without ulcers or lesions  NECK: no adenopathy, no asymmetry, masses, or scars and thyroid normal to palpation  RESP: lungs clear to auscultation - no rales, rhonchi or wheezes  CV: regular rate and rhythm, normal S1 S2, no S3 or S4, no murmur, click or rub, no peripheral edema and peripheral pulses strong  ABDOMEN: soft, nontender, no hepatosplenomegaly, no masses and bowel sounds normal  MS: no gross musculoskeletal defects noted, no edema  SKIN: no suspicious lesions or rashes  NEURO: Normal strength and tone, mentation intact and speech normal  PSYCH: mentation appears normal, affect normal/bright      ASSESSMENT/PLAN:   Jesus was seen today for physical.    Diagnoses and all orders for this visit:    Routine general medical examination at a health care facility  -     CBC with platelets; Future  -     Basic metabolic panel  (Ca, Cl, CO2, Creat, Gluc, K, Na, " "BUN); Future    Mild recurrent major depression (H)  Discussion regarding further treatment options was today as well as other causes of fatigue and lack of motivation.  Patient willing to try Wellbutrin now and see if he notes improvement.  Possible side effects discussed including lowering seizure threshold.  No personal history but does have night sweats.  Crisis line available and consider counseling in the future.  Follow-up in 2 to 3 months.  -     buPROPion (WELLBUTRIN XL) 150 MG 24 hr tablet; Take 1 tablet (150 mg) by mouth every morning    Lipid screening  -     Lipid panel reflex to direct LDL Fasting; Future    Low TSH level  Decreased with acute illness.  Repeat labs today  -     TSH with free T4 reflex; Future    Arachnoid cyst  Following with neurosurgery for continued imaging monitoring.  No focal deficits and previous Bell's palsy resolved.    Need for hepatitis C screening test  -     Hepatitis C Screen Reflex to HCV RNA Quant and Genotype; Future    Other orders  -     TDAP VACCINE (Adacel, Boostrix)  -     REVIEW OF HEALTH MAINTENANCE PROTOCOL ORDERS        Patient has been advised of split billing requirements and indicates understanding: Yes      COUNSELING:    Reviewed preventive health counseling, as reflected in patient instructions       Regular exercise       Healthy diet/nutrition       Vision screening       Hearing screening       Immunizations       Alcohol Use        Consider Hep C screening for all patients one time for ages 18-79 years       HIV screeninx in teen years, 1x in adult years, and at intervals if high risk       Colorectal cancer screening       Prostate cancer screening      BMI:   Estimated body mass index is 29.68 kg/m  as calculated from the following:    Height as of this encounter: 1.797 m (5' 10.75\").    Weight as of this encounter: 95.8 kg (211 lb 4.8 oz).   Weight management plan: Discussed healthy diet and exercise guidelines      He reports that he has " never smoked. He has never used smokeless tobacco.            Jerome Reyes MD  [facility]  Answers for HPI/ROS submitted by the patient on 7/13/2023  If you checked off any problems, how difficult have these problems made it for you to do your work, take care of things at home, or get along with other people?: Somewhat difficult  PHQ9 TOTAL SCORE: 9

## 2023-09-21 ENCOUNTER — TELEPHONE (OUTPATIENT)
Dept: NEUROSURGERY | Facility: CLINIC | Age: 33
End: 2023-09-21

## 2023-09-21 NOTE — TELEPHONE ENCOUNTER
LVM- Sched return in 1 year (around 2/1/2024) for Follow up with Dayan Bradley with MRI prior to appt.

## 2023-10-31 ENCOUNTER — TRANSFERRED RECORDS (OUTPATIENT)
Dept: HEALTH INFORMATION MANAGEMENT | Facility: CLINIC | Age: 33
End: 2023-10-31
Payer: COMMERCIAL

## 2024-01-31 ENCOUNTER — ANCILLARY PROCEDURE (OUTPATIENT)
Dept: MRI IMAGING | Facility: CLINIC | Age: 34
End: 2024-01-31
Attending: NEUROLOGICAL SURGERY
Payer: COMMERCIAL

## 2024-01-31 ENCOUNTER — OFFICE VISIT (OUTPATIENT)
Dept: NEUROSURGERY | Facility: CLINIC | Age: 34
End: 2024-01-31
Payer: COMMERCIAL

## 2024-01-31 VITALS — HEART RATE: 80 BPM | SYSTOLIC BLOOD PRESSURE: 120 MMHG | DIASTOLIC BLOOD PRESSURE: 79 MMHG

## 2024-01-31 DIAGNOSIS — G93.0 ARACHNOID CYST: ICD-10-CM

## 2024-01-31 DIAGNOSIS — G93.0 ARACHNOID CYST: Primary | ICD-10-CM

## 2024-01-31 PROCEDURE — 70553 MRI BRAIN STEM W/O & W/DYE: CPT | Performed by: RADIOLOGY

## 2024-01-31 PROCEDURE — A9585 GADOBUTROL INJECTION: HCPCS | Performed by: RADIOLOGY

## 2024-01-31 PROCEDURE — 99213 OFFICE O/P EST LOW 20 MIN: CPT | Performed by: PHYSICIAN ASSISTANT

## 2024-01-31 RX ORDER — GADOBUTROL 604.72 MG/ML
10 INJECTION INTRAVENOUS ONCE
Status: COMPLETED | OUTPATIENT
Start: 2024-01-31 | End: 2024-01-31

## 2024-01-31 RX ADMIN — GADOBUTROL 9.5 ML: 604.72 INJECTION INTRAVENOUS at 07:30

## 2024-01-31 NOTE — DISCHARGE INSTRUCTIONS
MRI Contrast Discharge Instructions    The IV contrast you received today will pass out of your body in your  urine. This will happen in the next 24 hours. You will not feel this process.  Your urine will not change color.    Drink at least 4 extra glasses of water or juice today (unless your doctor  has restricted your fluids). This reduces the stress on your kidneys.  You may take your regular medicines.    If you are on dialysis: It is best to have dialysis today.    If you have a reaction: Most reactions happen right away. If you have  any new symptoms after leaving the hospital (such as hives or swelling),  call your hospital at the correct number below. Or call your family doctor.  If you have breathing distress or wheezing, call 911.    Special instructions: ***    I have read and understand the above information.    Signature:______________________________________ Date:___________    Staff:__________________________________________ Date:___________     Time:__________    [facility] Departments:    ___[place]: [phone]  ___[facility]: [phone]  ___[place]: [phone] ___[facility]: [phone]  ___[facility]: [phone]  ___[facility]: [phone]  ___[phone]  ___[facility]: [phone]  ___[phone]
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
## 2024-02-12 ENCOUNTER — TELEPHONE (OUTPATIENT)
Dept: NEUROSURGERY | Facility: CLINIC | Age: 34
End: 2024-02-12
Payer: COMMERCIAL

## 2024-02-12 NOTE — TELEPHONE ENCOUNTER
Left Voicemail (1st Attempt) and Sent Mychart (1st Attempt) for the patient to call back and schedule the following:    Appointment type: Return Adult Neurosurg (in person or virtual ok)  Provider: Dayan Bradley PA-C  Return date: Around 1/31/2025  Specialty phone number: [phone]  Additional appointment(s) needed: MRI prior  Additional Notes: N/A    Lv Nunez on 2/12/2024 at 2:24 PM     S/w pt about concerns. He was just wondering how long he would need to fast prior to Lipid profile. Educated pt to please fast at least 10 hours and OK to have water. Pt appreciative of information and denies further needs at this time.     ----- Message from Jacy Weller sent at 2/12/2018 11:35 AM PST -----  Regarding: lab orders & med advice  Contact: [phone]  FERN/lydia    Pt calling with questions about his lab orders & meds prior to draw.  Please call .

## 2024-02-14 ENCOUNTER — TELEPHONE (OUTPATIENT)
Dept: NEUROSURGERY | Facility: CLINIC | Age: 34
End: 2024-02-14
Payer: COMMERCIAL

## 2024-02-14 NOTE — TELEPHONE ENCOUNTER
Left Voicemail (2nd Attempt) for the patient to call back and schedule the following:    Appointment type: Return Adult Neurosurg (in person or virtual ok)  Provider: Dayan Bradley PA-C  Return date: Around 1/31/2025  Specialty phone number: [phone]  Additional appointment(s) needed: MRI prior  Additional Notes: N/A    Lv Nunez on 2/14/2024 at 11:53 AM

## 2024-06-13 ENCOUNTER — PATIENT OUTREACH (OUTPATIENT)
Dept: CARE COORDINATION | Facility: CLINIC | Age: 34
End: 2024-06-13
Payer: COMMERCIAL

## 2024-08-21 SDOH — HEALTH STABILITY: PHYSICAL HEALTH: ON AVERAGE, HOW MANY DAYS PER WEEK DO YOU ENGAGE IN MODERATE TO STRENUOUS EXERCISE (LIKE A BRISK WALK)?: 3 DAYS

## 2024-08-21 SDOH — HEALTH STABILITY: PHYSICAL HEALTH: ON AVERAGE, HOW MANY MINUTES DO YOU ENGAGE IN EXERCISE AT THIS LEVEL?: 30 MIN

## 2024-08-21 ASSESSMENT — PATIENT HEALTH QUESTIONNAIRE - PHQ9
10. IF YOU CHECKED OFF ANY PROBLEMS, HOW DIFFICULT HAVE THESE PROBLEMS MADE IT FOR YOU TO DO YOUR WORK, TAKE CARE OF THINGS AT HOME, OR GET ALONG WITH OTHER PEOPLE: SOMEWHAT DIFFICULT
SUM OF ALL RESPONSES TO PHQ QUESTIONS 1-9: 7
SUM OF ALL RESPONSES TO PHQ QUESTIONS 1-9: 7

## 2024-08-21 ASSESSMENT — SOCIAL DETERMINANTS OF HEALTH (SDOH): HOW OFTEN DO YOU GET TOGETHER WITH FRIENDS OR RELATIVES?: ONCE A WEEK

## 2024-08-22 ENCOUNTER — OFFICE VISIT (OUTPATIENT)
Dept: FAMILY MEDICINE | Facility: CLINIC | Age: 34
End: 2024-08-22
Payer: COMMERCIAL

## 2024-08-22 VITALS
SYSTOLIC BLOOD PRESSURE: 104 MMHG | WEIGHT: 210.8 LBS | BODY MASS INDEX: 30.18 KG/M2 | HEART RATE: 68 BPM | RESPIRATION RATE: 12 BRPM | TEMPERATURE: 96.7 F | DIASTOLIC BLOOD PRESSURE: 74 MMHG | OXYGEN SATURATION: 97 % | HEIGHT: 70 IN

## 2024-08-22 DIAGNOSIS — F33.0 MILD RECURRENT MAJOR DEPRESSION (H): ICD-10-CM

## 2024-08-22 DIAGNOSIS — G93.0 ARACHNOID CYST: ICD-10-CM

## 2024-08-22 DIAGNOSIS — Z00.00 ROUTINE GENERAL MEDICAL EXAMINATION AT A HEALTH CARE FACILITY: Primary | ICD-10-CM

## 2024-08-22 DIAGNOSIS — R41.840 INATTENTION: ICD-10-CM

## 2024-08-22 PROCEDURE — 99395 PREV VISIT EST AGE 18-39: CPT | Performed by: STUDENT IN AN ORGANIZED HEALTH CARE EDUCATION/TRAINING PROGRAM

## 2024-08-22 RX ORDER — BUPROPION HYDROCHLORIDE 300 MG/1
300 TABLET ORAL EVERY MORNING
Qty: 90 TABLET | Refills: 3 | Status: SHIPPED | OUTPATIENT
Start: 2024-08-22

## 2024-08-22 ASSESSMENT — PAIN SCALES - GENERAL: PAINLEVEL: NO PAIN (0)

## 2024-08-22 NOTE — PROGRESS NOTES
"Preventive Care Visit  [facility]  Jerome Reyes MD, Family Medicine  Aug 22, 2024      Assessment & Plan   Problem List Items Addressed This Visit    None  Visit Diagnoses       Routine general medical examination at a health care facility    -  Primary    Mild recurrent major depression (H24)        Relevant Medications    buPROPion (WELLBUTRIN XL) 300 MG 24 hr tablet    Inattention        Arachnoid cyst               Age-appropriate screening and immunization discussed.  Continue to follow with neurosurgery with repeat imaging yearly.  Mood stable and did not think Wellbutrin helped for some time previously then tapered off.  Mostly struggles with his inattention and we did discuss potential for ADHD evaluation but meets criteria for depression that is likely contributing.  Will plan to restart Wellbutrin 150 mg for 1 to 2 weeks and then increase to 300 after.  Follow-up in 3 months if having issues.  Previous labs reviewed and stable.  Consider repeat lipids and glucose next year with weight and activity stable.    Patient has been advised of split billing requirements and indicates understanding: Yes       BMI  Estimated body mass index is 30.25 kg/m  as calculated from the following:    Height as of this encounter: 1.778 m (5' 10\").    Weight as of this encounter: 95.6 kg (210 lb 12.8 oz).   Weight management plan: Discussed healthy diet and exercise guidelines    Counseling  Appropriate preventive services were addressed with this patient via screening, questionnaire, or discussion as appropriate for fall prevention, nutrition, physical activity, Tobacco-use cessation, social engagement, weight loss and cognition.  Checklist reviewing preventive services available has been given to the patient.  Reviewed patient's diet, addressing concerns and/or questions.   He is at risk for lack of exercise and has been provided with information to increase physical activity for the " benefit of his well-being.   The patient's PHQ-9 score is consistent with mild depression. He was provided with information regarding depression.         Leidy Lee is a 34 year old, presenting for the following:  Physical        8/22/2024     7:11 AM   Additional Questions   Roomed by Cheryl STRICKLAND        Health Care Directive  Patient does not have a Health Care Directive or Living Will: Discussed advance care planning with patient; information given to patient to review.    HPI        8/21/2024   General Health   How would you rate your overall physical health? (!) FAIR   Feel stress (tense, anxious, or unable to sleep) To some extent      (!) STRESS CONCERN      8/21/2024   Nutrition   Three or more servings of calcium each day? Yes   Diet: Regular (no restrictions)   How many servings of fruit and vegetables per day? (!) 0-1   How many sweetened beverages each day? 0-1            8/21/2024   Exercise   Days per week of moderate/strenous exercise 3 days   Average minutes spent exercising at this level 30 min            8/21/2024   Social Factors   Frequency of gathering with friends or relatives Once a week   Worry food won't last until get money to buy more No   Food not last or not have enough money for food? No   Do you have housing? (Housing is defined as stable permanent housing and does not include staying ouside in a car, in a tent, in an abandoned building, in an overnight shelter, or couch-surfing.) No   Are you worried about losing your housing? No   Lack of transportation? No   Unable to get utilities (heat,electricity)? No   Want help with housing or utility concern? No      (!) HOUSING CONCERN PRESENT      8/21/2024   Dental   Dentist two times every year? Yes            8/21/2024   TB Screening   Were you born outside of the [place]? No          Today's PHQ-9 Score:       8/21/2024    10:27 AM   PHQ-9 SCORE   PHQ-9 Total Score MyChart 7 (Mild depression)   PHQ-9 Total Score 7         8/21/2024  "  Substance Use   Alcohol more than 3/day or more than 7/wk No   Do you use any other substances recreationally? No        Social History     Tobacco Use    Smoking status: Never    Smokeless tobacco: Never   Vaping Use    Vaping status: Never Used   Substance Use Topics    Alcohol use: No    Drug use: No           8/21/2024   STI Screening   New sexual partner(s) since last STI/HIV test? No            8/21/2024   Contraception/Family Planning   Questions about contraception or family planning No           Reviewed and updated as needed this visit by Provider                    Past Medical History:   Diagnosis Date    Depressive disorder 06/2022     Past Surgical History:   Procedure Laterality Date    SOFT TISSUE SURGERY  2010    Right Meniscus    [facility] UGI ENDOSCOPY, SIMPLE EXAM  05/28/2009    [facility] UGI ENDOSCOPY, SIMPLE EXAM  05/12/2014         Review of Systems  Constitutional, HEENT, cardiovascular, pulmonary, GI, , musculoskeletal, neuro, skin, endocrine and psych systems are negative, except as otherwise noted.     Objective    Exam  /74   Pulse 68   Temp (!) 96.7  F (35.9  C) (Temporal)   Resp 12   Ht 1.778 m (5' 10\")   Wt 95.6 kg (210 lb 12.8 oz)   SpO2 97%   BMI 30.25 kg/m     Estimated body mass index is 30.25 kg/m  as calculated from the following:    Height as of this encounter: 1.778 m (5' 10\").    Weight as of this encounter: 95.6 kg (210 lb 12.8 oz).    Physical Exam  GENERAL: alert and no distress  EYES: Eyes grossly normal to inspection, PERRL and conjunctivae and sclerae normal  HENT: ear canals and TM's normal, nose and mouth without ulcers or lesions  NECK: no adenopathy, no asymmetry, masses, or scars  RESP: lungs clear to auscultation - no rales, rhonchi or wheezes  CV: regular rate and rhythm, normal S1 S2, no S3 or S4, no murmur, click or rub, no peripheral edema  ABDOMEN: soft, nontender, no hepatosplenomegaly, no masses and bowel sounds normal  MS: no gross musculoskeletal " defects noted, no edema  SKIN: no suspicious lesions or rashes  NEURO: Normal strength and tone, mentation intact and speech normal  PSYCH: mentation appears normal, affect normal/bright        Signed Electronically by: Jerome Reyes MD

## 2024-08-22 NOTE — PATIENT INSTRUCTIONS
Patient Education   Preventive Care Advice   This is general advice given by our system to help you stay healthy. However, your care team may have specific advice just for you. Please talk to your care team about your preventive care needs.  Nutrition  Eat 5 or more servings of fruits and vegetables each day.  Try wheat bread, brown rice and whole grain pasta (instead of white bread, rice, and pasta).  Get enough calcium and vitamin D. Check the label on foods and aim for 100% of the RDA (recommended daily allowance).  Lifestyle  Exercise at least 150 minutes each week  (30 minutes a day, 5 days a week).  Do muscle strengthening activities 2 days a week. These help control your weight and prevent disease.  No smoking.  Wear sunscreen to prevent skin cancer.  Have a dental exam and cleaning every 6 months.  Yearly exams  See your health care team every year to talk about:  Any changes in your health.  Any medicines your care team has prescribed.  Preventive care, family planning, and ways to prevent chronic diseases.  Shots (vaccines)   HPV shots (up to age 26), if you've never had them before.  Hepatitis B shots (up to age 59), if you've never had them before.  COVID-19 shot: Get this shot when it's due.  Flu shot: Get a flu shot every year.  Tetanus shot: Get a tetanus shot every 10 years.  Pneumococcal, hepatitis A, and RSV shots: Ask your care team if you need these based on your risk.  Shingles shot (for age 50 and up)  General health tests  Diabetes screening:  Starting at age 35, Get screened for diabetes at least every 3 years.  If you are younger than age 35, ask your care team if you should be screened for diabetes.  Cholesterol test: At age 39, start having a cholesterol test every 5 years, or more often if advised.  Bone density scan (DEXA): At age 50, ask your care team if you should have this scan for osteoporosis (brittle bones).  Hepatitis C: Get tested at least once in your life.  STIs (sexually  transmitted infections)  Before age 24: Ask your care team if you should be screened for STIs.  After age 24: Get screened for STIs if you're at risk. You are at risk for STIs (including HIV) if:  You are sexually active with more than one person.  You don't use condoms every time.  You or a partner was diagnosed with a sexually transmitted infection.  If you are at risk for HIV, ask about PrEP medicine to prevent HIV.  Get tested for HIV at least once in your life, whether you are at risk for HIV or not.  Cancer screening tests  Cervical cancer screening: If you have a cervix, begin getting regular cervical cancer screening tests starting at age 21.  Breast cancer scan (mammogram): If you've ever had breasts, begin having regular mammograms starting at age 40. This is a scan to check for breast cancer.  Colon cancer screening: It is important to start screening for colon cancer at age 45.  Have a colonoscopy test every 10 years (or more often if you're at risk) Or, ask your provider about stool tests like a FIT test every year or Cologuard test every 3 years.  To learn more about your testing options, visit:   .  For help making a decision, visit:   https://bit.ly/tv77934.  Prostate cancer screening test: If you have a prostate, ask your care team if a prostate cancer screening test (PSA) at age 55 is right for you.  Lung cancer screening: If you are a current or former smoker ages 50 to 80, ask your care team if ongoing lung cancer screenings are right for you.  For informational purposes only. Not to replace the advice of your health care provider. Copyright   2023 [facility]. All rights reserved. Clinically reviewed by the [facility]. [employer] 099901 - REV 01/24.  Learning About Depression Screening  What is depression screening?  Depression screening is a way to see if you have depression symptoms. It may be done by a doctor or counselor. It's often part of a routine  "checkup. That's because your mental health is just as important as your physical health.  Depression is a mental health condition that affects how you feel, think, and act. You may:  Have less energy.  Lose interest in your daily activities.  Feel sad and grouchy for a long time.  Depression is very common. It affects people of all ages.  Many things can lead to depression. Some people become depressed after they have a stroke or find out they have a major illness like cancer or heart disease. The death of a loved one or a breakup may lead to depression. It can run in families. Most experts believe that a combination of inherited genes and stressful life events can cause it.  What happens during screening?  You may be asked to fill out a form about your depression symptoms. You and the doctor will discuss your answers. The doctor may ask you more questions to learn more about how you think, act, and feel.  What happens after screening?  If you have symptoms of depression, your doctor will talk to you about your options.  Doctors usually treat depression with medicines or counseling. Often, combining the two works best. Many people don't get help because they think that they'll get over the depression on their own. But people with depression may not get better unless they get treatment.  The cause of depression is not well understood. There may be many factors involved. But if you have depression, it's not your fault.  A serious symptom of depression is thinking about death or suicide. If you or someone you care about talks about this or about feeling hopeless, get help right away.  It's important to know that depression can be treated. Medicine, counseling, and self-care may help.  Where can you learn more?  Go to https://www.[employer].net/patiented  Enter T185 in the search box to learn more about \"Learning About Depression Screening.\"  Current as of: June 24, 2023  Content Version: 14.1 2006-2024 [employer] " [employer].   Care instructions adapted under license by your healthcare professional. If you have questions about a medical condition or this instruction, always ask your healthcare professional. [employer] disclaims any warranty or liability for your use of this information.

## 2025-02-03 ENCOUNTER — TELEPHONE (OUTPATIENT)
Dept: NEUROSURGERY | Facility: CLINIC | Age: 35
End: 2025-02-03
Payer: COMMERCIAL

## 2025-02-03 DIAGNOSIS — G93.0 ARACHNOID CYST: Primary | ICD-10-CM

## 2025-02-03 NOTE — TELEPHONE ENCOUNTER
Spoke with Zahraa from MRI. She states that another tech modified the order and signed it under their name. This needs to be cosigned by a provider, not a radiology tech. Zahraa changed the order and sent it to Dayan Bradley to be signed. Pt is good to go for MRI on 2/7.

## 2025-02-03 NOTE — TELEPHONE ENCOUNTER
[facility] Call Center    Phone Message    May a detailed message be left on voicemail: No    Reason for Call: Other: Patient has imaging scheduled for later this week and the Referral needs to be signed by provider.     Action Taken: Message routed to:  [facility] Neurosurgery    Travel Screening: Not Applicable     Date of Service:

## 2025-02-07 ENCOUNTER — HOSPITAL ENCOUNTER (OUTPATIENT)
Dept: MRI IMAGING | Facility: CLINIC | Age: 35
Discharge: HOME | End: 2025-02-07
Attending: PHYSICIAN ASSISTANT | Admitting: PHYSICIAN ASSISTANT
Payer: COMMERCIAL

## 2025-02-07 DIAGNOSIS — G93.0 ARACHNOID CYST: ICD-10-CM

## 2025-02-07 PROCEDURE — A9585 GADOBUTROL INJECTION: HCPCS | Performed by: PHYSICIAN ASSISTANT

## 2025-02-07 PROCEDURE — 70553 MRI BRAIN STEM W/O & W/DYE: CPT

## 2025-02-07 PROCEDURE — 255N000002 HC RX 255 OP 636: Performed by: PHYSICIAN ASSISTANT

## 2025-02-07 RX ORDER — GADOBUTROL 604.72 MG/ML
9.5 INJECTION INTRAVENOUS ONCE
Status: COMPLETED | OUTPATIENT
Start: 2025-02-07 | End: 2025-02-07

## 2025-02-07 RX ADMIN — GADOBUTROL 9.5 ML: 604.72 INJECTION INTRAVENOUS at 10:21

## 2025-02-10 NOTE — PROGRESS NOTES
[facility]  Department of Neurosurgery  [facility]    Name: Jesus Savage  MRN: [id number]  Age: 35 year old  : 1990  2025     Chief Complaint:   Retrocerebellar arachnoid cyst, follow up visit     History of Present Illness:   Jesus Savage is a 35 year old male who is seen today regarding an incidentally discovered right posterior fossa arachnoid cyst, found in  after presenting to the ED with left facial weakness. Today by video he reports that he's feeling well and has no new complaints. He is somewhat bothered by the fact that this cyst exists in his brain. He has not had new headaches, new recurrent falls or imbalance.    Review of Systems:   Pertinent items are noted in HPI or as in patient entered ROS below, remainder of complete ROS is negative.     Physical Exam:   Exam today is limited by video visit. Face appears symmetric. Speech is normal.     Imaging:  We reviewed the MRI completed on 2025 revealing a stable right sided cerebellar arachnoid cyst without new enhancement or change. There is an unchanged developmental venous anomaly in the right frontal lobe as before. No new lesion.     Assessment:  Cerebellar arachnoid cyst, follow up visit     Plan:  We reviewed the MRI findings by video today which are very reassuring. Will plan for 1-2 year follow up with a noncontrasted MRI prior. He was encouraged to reach out sooner as needed with new concerns.        Dayan Bradley PA-C  Department of Neurosurgery

## 2025-02-11 ENCOUNTER — VIRTUAL VISIT (OUTPATIENT)
Dept: NEUROSURGERY | Facility: CLINIC | Age: 35
End: 2025-02-11
Payer: COMMERCIAL

## 2025-02-11 DIAGNOSIS — G93.0 ARACHNOID CYST: Primary | ICD-10-CM

## 2025-02-11 PROCEDURE — 98004 SYNCH AUDIO-VIDEO EST SF 10: CPT | Performed by: PHYSICIAN ASSISTANT

## 2025-02-11 NOTE — NURSING NOTE
Current patient location: [id number] [address] [facility] [address]    Is the patient currently in the state of [place]? YES    Visit mode: VIDEO    If the visit is dropped, the patient can be reconnected by:TELEPHONE VISIT: Phone number:   Telephone Information:   Mobile [phone]       Will anyone else be joining the visit? NO  (If patient encounters technical issues they should call [phone])    Are changes needed to the allergy or medication list? Pt stated no med changes    Are refills needed on medications prescribed by this physician? NO    Rooming Documentation:  Questionnaire(s) completed    Reason for visit: Video Visit (Arachnoid cyst )    Esperanza RINCON

## 2025-02-11 NOTE — PROGRESS NOTES
Virtual Visit Details    Type of service:  Video Visit     Originating Location (pt. Location): Home    Distant Location (provider location):  Off-site  Platform used for Video Visit: [employer]  Video start time: 12:30PM  Video end time: 12:40PM

## 2025-02-11 NOTE — PATIENT INSTRUCTIONS
Return in 1-2 years, with MRI brain without contrast prior to appointment.  Please reach out sooner with any new questions or concerns.  Take care.

## 2025-07-21 ENCOUNTER — TELEPHONE (OUTPATIENT)
Dept: FAMILY MEDICINE | Facility: CLINIC | Age: 35
End: 2025-07-21
Payer: COMMERCIAL

## 2025-07-21 ENCOUNTER — MYC MEDICAL ADVICE (OUTPATIENT)
Dept: FAMILY MEDICINE | Facility: CLINIC | Age: 35
End: 2025-07-21
Payer: COMMERCIAL

## 2025-08-16 SDOH — HEALTH STABILITY: PHYSICAL HEALTH: ON AVERAGE, HOW MANY MINUTES DO YOU ENGAGE IN EXERCISE AT THIS LEVEL?: 30 MIN

## 2025-08-16 SDOH — HEALTH STABILITY: PHYSICAL HEALTH: ON AVERAGE, HOW MANY DAYS PER WEEK DO YOU ENGAGE IN MODERATE TO STRENUOUS EXERCISE (LIKE A BRISK WALK)?: 4 DAYS

## 2025-08-16 ASSESSMENT — SOCIAL DETERMINANTS OF HEALTH (SDOH): HOW OFTEN DO YOU GET TOGETHER WITH FRIENDS OR RELATIVES?: ONCE A WEEK

## 2025-08-21 ENCOUNTER — OFFICE VISIT (OUTPATIENT)
Dept: FAMILY MEDICINE | Facility: CLINIC | Age: 35
End: 2025-08-21
Attending: STUDENT IN AN ORGANIZED HEALTH CARE EDUCATION/TRAINING PROGRAM
Payer: COMMERCIAL

## 2025-08-21 VITALS
SYSTOLIC BLOOD PRESSURE: 108 MMHG | OXYGEN SATURATION: 99 % | RESPIRATION RATE: 12 BRPM | WEIGHT: 205.2 LBS | HEART RATE: 65 BPM | BODY MASS INDEX: 27.79 KG/M2 | TEMPERATURE: 97.6 F | HEIGHT: 72 IN | DIASTOLIC BLOOD PRESSURE: 68 MMHG

## 2025-08-21 DIAGNOSIS — G93.0 ARACHNOID CYST: ICD-10-CM

## 2025-08-21 DIAGNOSIS — Z00.00 ROUTINE GENERAL MEDICAL EXAMINATION AT A HEALTH CARE FACILITY: Primary | ICD-10-CM

## 2025-08-21 DIAGNOSIS — F33.0 MILD RECURRENT MAJOR DEPRESSION: ICD-10-CM

## 2025-08-21 DIAGNOSIS — R41.840 INATTENTION: ICD-10-CM

## 2025-08-21 ASSESSMENT — PATIENT HEALTH QUESTIONNAIRE - PHQ9
10. IF YOU CHECKED OFF ANY PROBLEMS, HOW DIFFICULT HAVE THESE PROBLEMS MADE IT FOR YOU TO DO YOUR WORK, TAKE CARE OF THINGS AT HOME, OR GET ALONG WITH OTHER PEOPLE: SOMEWHAT DIFFICULT
SUM OF ALL RESPONSES TO PHQ QUESTIONS 1-9: 3
SUM OF ALL RESPONSES TO PHQ QUESTIONS 1-9: 3